# Patient Record
Sex: FEMALE | Employment: FULL TIME | URBAN - METROPOLITAN AREA
[De-identification: names, ages, dates, MRNs, and addresses within clinical notes are randomized per-mention and may not be internally consistent; named-entity substitution may affect disease eponyms.]

---

## 2022-08-26 NOTE — PROGRESS NOTES
"              Assessment & Plan     Routine history and physical examination of adult  Health maintenance reviewed and updated.    Mild recurrent major depression (H)  Stable, refills given   Plan recheck in 6 months / okay to do video visit  - sertraline (ZOLOFT) 50 MG tablet; Take 1 tablet (50 mg) by mouth daily    Visit for screening mammogram  - *MA Screening Digital Bilateral; Future    Herpes simplex  Stable, refills given  - valACYclovir (VALTREX) 1000 mg tablet; Take 1 tablet (1,000 mg) by mouth daily Take 1,000 mg by mouth daily    Screening for malignant neoplasm of cervix  - Pap Screen with HPV - recommended age 30 - 65 years             BMI:   Estimated body mass index is 28.43 kg/m  as calculated from the following:    Height as of this encounter: 1.689 m (5' 6.5\").    Weight as of this encounter: 81.1 kg (178 lb 12.8 oz).   Weight management plan: Discussed healthy diet and exercise guidelines        Return in about 6 months (around 2/28/2023) for depression / anxiety, medication check.    Kristen M. Kehr, PA-C [facility]    Meghna Polanco is a 42 year old, presenting for the following health issues:  Establish Care    She will need refills of her medications.   1. Depression: managed with sertraline 50 mg daily  2. Herpes simplex: managed with daily suppressive vacyclovir 1 gm  3. She also has an IUD x 5 years. She can not feel the strings.       History of Present Illness       Reason for visit:  New patient/physical    She eats 2-3 servings of fruits and vegetables daily.She consumes 0 sweetened beverage(s) daily.She exercises with enough effort to increase her heart rate 30 to 60 minutes per day.  She exercises with enough effort to increase her heart rate 4 days per week. She is missing 1 dose(s) of medications per week.     Patient requesting refills for valacyclovir and sertraline. Patient requesting to have IUD strings checked.     PMH / PSH: reviewed and updated. " "      Review of Systems   Constitutional, HEENT, cardiovascular, pulmonary, GI, , musculoskeletal, neuro, skin, endocrine and psych systems are negative, except as otherwise noted.      Objective    /76   Pulse 58   Temp 98.5  F (36.9  C) (Tympanic)   Resp 16   Ht 1.689 m (5' 6.5\")   Wt 81.1 kg (178 lb 12.8 oz)   LMP 08/28/2022 (Exact Date)   SpO2 100%   BMI 28.43 kg/m    Body mass index is 28.43 kg/m .  Physical Exam   GENERAL: healthy, alert and no distress  EYES: Eyes grossly normal to inspection, PERRL and conjunctivae and sclerae normal  HENT: ear canals and TM's normal, nose and mouth without ulcers or lesions  NECK: no adenopathy, no asymmetry, masses, or scars and thyroid normal to palpation  RESP: lungs clear to auscultation - no rales, rhonchi or wheezes  CV: regular rate and rhythm, normal S1 S2, no S3 or S4, no murmur, click or rub, no peripheral edema and peripheral pulses strong  ABDOMEN: soft, nontender, no hepatosplenomegaly, no masses and bowel sounds normal   (female): normal female external genitalia, normal urethral meatus , vaginal mucosa pink, moist, well rugated, normal cervix, adnexae, and uterus without masses. IUD strings are visualized  MS: no gross musculoskeletal defects noted, no edema  SKIN: no suspicious lesions or rashes  NEURO: Normal strength and tone, mentation intact and speech normal  BACK: no CVA tenderness, no paralumbar tenderness                    .  ..  "

## 2022-08-29 ENCOUNTER — OFFICE VISIT (OUTPATIENT)
Dept: FAMILY MEDICINE | Facility: CLINIC | Age: 42
End: 2022-08-29
Payer: COMMERCIAL

## 2022-08-29 VITALS
TEMPERATURE: 98.5 F | OXYGEN SATURATION: 100 % | BODY MASS INDEX: 28.73 KG/M2 | HEIGHT: 66 IN | RESPIRATION RATE: 16 BRPM | SYSTOLIC BLOOD PRESSURE: 125 MMHG | WEIGHT: 178.8 LBS | DIASTOLIC BLOOD PRESSURE: 76 MMHG | HEART RATE: 58 BPM

## 2022-08-29 DIAGNOSIS — F33.0 MILD RECURRENT MAJOR DEPRESSION (H): ICD-10-CM

## 2022-08-29 DIAGNOSIS — Z12.31 VISIT FOR SCREENING MAMMOGRAM: ICD-10-CM

## 2022-08-29 DIAGNOSIS — Z12.4 SCREENING FOR MALIGNANT NEOPLASM OF CERVIX: ICD-10-CM

## 2022-08-29 DIAGNOSIS — Z00.00 ROUTINE HISTORY AND PHYSICAL EXAMINATION OF ADULT: Primary | ICD-10-CM

## 2022-08-29 DIAGNOSIS — B00.9 HERPES SIMPLEX: ICD-10-CM

## 2022-08-29 PROCEDURE — 99386 PREV VISIT NEW AGE 40-64: CPT | Performed by: PHYSICIAN ASSISTANT

## 2022-08-29 PROCEDURE — 99213 OFFICE O/P EST LOW 20 MIN: CPT | Mod: 25 | Performed by: PHYSICIAN ASSISTANT

## 2022-08-29 PROCEDURE — G0145 SCR C/V CYTO,THINLAYER,RESCR: HCPCS | Performed by: PHYSICIAN ASSISTANT

## 2022-08-29 PROCEDURE — 87624 HPV HI-RISK TYP POOLED RSLT: CPT | Performed by: PHYSICIAN ASSISTANT

## 2022-08-29 RX ORDER — CETIRIZINE HYDROCHLORIDE 10 MG/1
10 TABLET ORAL
COMMUNITY

## 2022-08-29 RX ORDER — VALACYCLOVIR HYDROCHLORIDE 1 G/1
1000 TABLET, FILM COATED ORAL DAILY
COMMUNITY
Start: 2022-08-05 | End: 2022-08-29

## 2022-08-29 RX ORDER — VALACYCLOVIR HYDROCHLORIDE 1 G/1
1000 TABLET, FILM COATED ORAL DAILY
Qty: 90 TABLET | Refills: 3 | Status: SHIPPED | OUTPATIENT
Start: 2022-08-29

## 2022-08-29 ASSESSMENT — PAIN SCALES - GENERAL: PAINLEVEL: NO PAIN (1)

## 2022-08-29 ASSESSMENT — PATIENT HEALTH QUESTIONNAIRE - PHQ9: SUM OF ALL RESPONSES TO PHQ QUESTIONS 1-9: 3

## 2022-08-29 NOTE — PROGRESS NOTES
SUBJECTIVE:   CC: Sherri Milton is an 42 year old woman who presents for preventive health visit.       Patient has been advised of split billing requirements and indicates understanding: Yes  HPI        PROBLEMS TO ADD ON...  1. Depression: managed with sertraline 50 mg daily  2. Herpes Simplex: managed with valtrex 1 gm daily for suppression    Today's PHQ-2 Score:   PHQ-2 ( 1999 Pf[employer]) 8/29/2022   Q1: Little interest or pleasure in doing things 0   Q2: Feeling down, depressed or hopeless 0   PHQ-2 Score 0   Q1: Little interest or pleasure in doing things Not at all   Q2: Feeling down, depressed or hopeless Not at all   PHQ-2 Score 0       Abuse: Current or Past (Physical, Sexual or Emotional) - No  Do you feel safe in your environment? Yes        Social History     Tobacco Use     Smoking status: Former Smoker     Smokeless tobacco: Never Used   Substance Use Topics     Alcohol use: Yes     Comment: 1 drink/day         No flowsheet data found.    Reviewed orders with patient.  Reviewed health maintenance and updated orders accordingly - Yes  BP Readings from Last 3 Encounters:   08/29/22 125/76    Wt Readings from Last 3 Encounters:   08/29/22 81.1 kg (178 lb 12.8 oz)                  Patient Active Problem List   Diagnosis     Mild recurrent major depression (H)     Herpes simplex     Past Surgical History:   Procedure Laterality Date     APPENDECTOMY         Social History     Tobacco Use     Smoking status: Former Smoker     Smokeless tobacco: Never Used   Substance Use Topics     Alcohol use: Yes     Comment: 1 drink/day     Family History   Problem Relation Age of Onset     Asthma Mother      Hyperlipidemia Mother      Hypertension Mother      Other - See Comments Mother         Factor II     Hypertension Father      Hyperlipidemia Father      Lymphoma Father      Heart Failure Maternal Grandmother      Diabetes Maternal Grandmother      Heart Disease Maternal Grandfather      Cerebrovascular Disease  Maternal Grandfather      Heart Failure Paternal Grandmother      Diabetes Paternal Grandfather      Heart Disease Paternal Grandfather      Other - See Comments Sister         Factor II     Asthma Son      Allergies Son         seasonal, peanut allergy         Current Outpatient Medications   Medication Sig Dispense Refill     cetirizine (ZYRTEC) 10 MG tablet Take 10 mg by mouth       sertraline (ZOLOFT) 50 MG tablet Take 1 tablet (50 mg) by mouth daily 90 tablet 1     valACYclovir (VALTREX) 1000 mg tablet Take 1 tablet (1,000 mg) by mouth daily Take 1,000 mg by mouth daily 90 tablet 3     Allergies   Allergen Reactions     Nuts Hives and Itching     Animal Dander Hives     Grass Extracts [Gramineae Pollens] Cough, Itching and Other (See Comments)     No lab results found.     Breast Cancer Screening:    FHS-7: No flowsheet data found.    Mammogram Screening - Offered annual screening and updated Health Maintenance for mutual plan based on risk factor consideration    Pertinent mammograms are reviewed under the imaging tab.    History of abnormal Pap smear: NO - age 30-65 PAP every 5 years with negative HPV co-testing recommended  Last 3 Pap and HPV Results:       Reviewed and updated as needed this visit by clinical staff   Tobacco  Allergies  Meds  Problems  Med Hx  Surg Hx  Fam Hx  Soc   Hx          Reviewed and updated as needed this visit by Provider   Tobacco  Allergies  Meds  Problems  Med Hx  Surg Hx  Fam Hx           Past Medical History:   Diagnosis Date     Depression      Factor II deficiency (H)      Herpes simplex       Past Surgical History:   Procedure Laterality Date     APPENDECTOMY       OB History   No obstetric history on file.       Review of Systems  CONSTITUTIONAL: NEGATIVE for fever, chills, change in weight  INTEGUMENTARU/SKIN: NEGATIVE for worrisome rashes, moles or lesions  EYES: NEGATIVE for vision changes or irritation  ENT: NEGATIVE for ear, mouth and throat  "problems  RESP: NEGATIVE for significant cough or SOB  BREAST: NEGATIVE for masses, tenderness or discharge  CV: NEGATIVE for chest pain, palpitations or peripheral edema  GI: NEGATIVE for nausea, abdominal pain, heartburn, or change in bowel habits  : NEGATIVE for unusual urinary or vaginal symptoms. Periods are regular.  MUSCULOSKELETAL: NEGATIVE for significant arthralgias or myalgia  NEURO: NEGATIVE for weakness, dizziness or paresthesias  ENDOCRINE: NEGATIVE for temperature intolerance, skin/hair changes  PSYCHIATRIC: NEGATIVE for changes in mood or affect     OBJECTIVE:   /76   Pulse 58   Temp 98.5  F (36.9  C) (Tympanic)   Resp 16   Ht 1.689 m (5' 6.5\")   Wt 81.1 kg (178 lb 12.8 oz)   LMP 08/28/2022 (Exact Date)   SpO2 100%   BMI 28.43 kg/m    Physical Exam  GENERAL: healthy, alert and no distress  EYES: Eyes grossly normal to inspection, PERRL and conjunctivae and sclerae normal  HENT: ear canals and TM's normal, nose and mouth without ulcers or lesions  NECK: no adenopathy, no asymmetry, masses, or scars and thyroid normal to palpation  RESP: lungs clear to auscultation - no rales, rhonchi or wheezes  BREAST: normal without masses, tenderness or nipple discharge and no palpable axillary masses or adenopathy  CV: regular rate and rhythm, normal S1 S2, no S3 or S4, no murmur, click or rub, no peripheral edema and peripheral pulses strong  ABDOMEN: soft, nontender, no hepatosplenomegaly, no masses and bowel sounds normal   (female): normal female external genitalia, normal urethral meatus, vaginal mucosa pink, moist, well rugated, and normal cervix/adnexa/uterus without masses. Blood discharge present, IUD strings visualized.   MS: no gross musculoskeletal defects noted, no edema  SKIN: no suspicious lesions or rashes  NEURO: Normal strength and tone, mentation intact and speech normal  PSYCH: mentation appears normal, affect normal/bright    Diagnostic Test Results:  Labs reviewed in " "Epic    ASSESSMENT/PLAN:   (Z00.00) Routine history and physical examination of adult  (primary encounter diagnosis)  Comment: Health maintenance reviewed and updated.      (F33.0) Mild recurrent major depression (H)  Comment: stable, refills given continue with the sertraline.   Plan follow up in 6 months. Video visit will be appropriate for follow up   Plan: sertraline (ZOLOFT) 50 MG tablet            (Z12.31) Visit for screening mammogram  Comment: scheduled.   Plan: *MA Screening Digital Bilateral            (B00.9) Herpes simplex  Comment: stable, refills given   Plan: valACYclovir (VALTREX) 1000 mg tablet            (Z12.4) Screening for malignant neoplasm of cervix  Plan: Pap Screen with HPV - recommended age 30 - 65         years              Patient has been advised of split billing requirements and indicates understanding: Yes    COUNSELING:  Reviewed preventive health counseling, as reflected in patient instructions       Regular exercise       Healthy diet/nutrition    Estimated body mass index is 28.43 kg/m  as calculated from the following:    Height as of this encounter: 1.689 m (5' 6.5\").    Weight as of this encounter: 81.1 kg (178 lb 12.8 oz).    Weight management plan: Discussed healthy diet and exercise guidelines    She reports that she has quit smoking. She has never used smokeless tobacco.      Counseling Resources:  ATP IV Guidelines  Pooled Cohorts Equation Calculator  Breast Cancer Risk Calculator  BRCA-Related Cancer Risk Assessment: FHS-7 Tool  FRAX Risk Assessment  ICSI Preventive Guidelines  Dietary Guidelines for Americans, 2010  USDA's MyPlate  ASA Prophylaxis  Lung CA Screening    Kristen M. Kehr, PA-C [facility]  "

## 2022-08-31 LAB
BKR LAB AP GYN ADEQUACY: NORMAL
BKR LAB AP GYN INTERPRETATION: NORMAL
BKR LAB AP HPV REFLEX: NORMAL
BKR LAB AP PREVIOUS ABNORMAL: NORMAL
PATH REPORT.COMMENTS IMP SPEC: NORMAL
PATH REPORT.COMMENTS IMP SPEC: NORMAL
PATH REPORT.RELEVANT HX SPEC: NORMAL

## 2022-09-01 LAB
HUMAN PAPILLOMA VIRUS 16 DNA: NEGATIVE
HUMAN PAPILLOMA VIRUS 18 DNA: NEGATIVE
HUMAN PAPILLOMA VIRUS FINAL DIAGNOSIS: ABNORMAL
HUMAN PAPILLOMA VIRUS OTHER HR: POSITIVE

## 2022-09-02 ENCOUNTER — PATIENT OUTREACH (OUTPATIENT)
Dept: FAMILY MEDICINE | Facility: CLINIC | Age: 42
End: 2022-09-02

## 2022-10-28 ENCOUNTER — ANCILLARY PROCEDURE (OUTPATIENT)
Dept: MAMMOGRAPHY | Facility: CLINIC | Age: 42
End: 2022-10-28
Attending: PHYSICIAN ASSISTANT
Payer: COMMERCIAL

## 2022-10-28 DIAGNOSIS — Z12.31 VISIT FOR SCREENING MAMMOGRAM: ICD-10-CM

## 2022-10-28 PROCEDURE — 77063 BREAST TOMOSYNTHESIS BI: CPT | Mod: TC | Performed by: STUDENT IN AN ORGANIZED HEALTH CARE EDUCATION/TRAINING PROGRAM

## 2022-10-28 PROCEDURE — 77067 SCR MAMMO BI INCL CAD: CPT | Mod: TC | Performed by: STUDENT IN AN ORGANIZED HEALTH CARE EDUCATION/TRAINING PROGRAM

## 2022-11-08 ENCOUNTER — ANCILLARY PROCEDURE (OUTPATIENT)
Dept: ULTRASOUND IMAGING | Facility: CLINIC | Age: 42
End: 2022-11-08
Attending: PHYSICIAN ASSISTANT
Payer: COMMERCIAL

## 2022-11-08 ENCOUNTER — ANCILLARY PROCEDURE (OUTPATIENT)
Dept: MAMMOGRAPHY | Facility: CLINIC | Age: 42
End: 2022-11-08
Attending: PHYSICIAN ASSISTANT
Payer: COMMERCIAL

## 2022-11-08 DIAGNOSIS — R92.8 ABNORMAL MAMMOGRAM: ICD-10-CM

## 2022-11-08 PROCEDURE — G0279 TOMOSYNTHESIS, MAMMO: HCPCS | Performed by: RADIOLOGY

## 2022-11-08 PROCEDURE — 76642 ULTRASOUND BREAST LIMITED: CPT | Mod: LT | Performed by: RADIOLOGY

## 2022-11-08 PROCEDURE — 77065 DX MAMMO INCL CAD UNI: CPT | Mod: LT | Performed by: RADIOLOGY

## 2022-12-28 ENCOUNTER — VIRTUAL VISIT (OUTPATIENT)
Dept: INTERNAL MEDICINE | Facility: CLINIC | Age: 42
End: 2022-12-28
Payer: COMMERCIAL

## 2022-12-28 DIAGNOSIS — G44.219 EPISODIC TENSION-TYPE HEADACHE, NOT INTRACTABLE: Primary | ICD-10-CM

## 2022-12-28 PROBLEM — F33.0 MILD RECURRENT MAJOR DEPRESSION (H): Status: RESOLVED | Noted: 2022-08-29 | Resolved: 2022-12-28

## 2022-12-28 PROCEDURE — 99213 OFFICE O/P EST LOW 20 MIN: CPT | Mod: GT | Performed by: INTERNAL MEDICINE

## 2022-12-28 NOTE — PROGRESS NOTES
ASSESSMENT AND PLAN:    Tension headache  History and evaluation today strongly suggest a myofascial cervical posture pain with muscle tension headache.  Discussed postural strain with computer work. Discussed using ibuprofen, 200 mg po bid prn, postural neck stretching and ROM, and postural points for chronic computer strain.  Reassured that significant pathology is not suggested, and follow up if fails to improve.     Video start time: 10:36 AM    Video end time: 10: 50 AM    [employer]    On site    Total time:  22 min    CHIEF COMPLAINT:  Headache    HISTORY OF PRESENT ILLNESS:  Sherri Milton is a 42 year old female with a recurrent right above eye headache, dull, non-throbbing, mostly during the day, headache with sense of tightness in back of neck. 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 Sleeps well, occasionally it is there on awakening.  A mild photophobia at times, but no throbbing, or nausea or visual disturbance, no symptoms or arm or leg incoordination or numbness or weakness.  A brief resting period can help.  Has two children, ages 11,12.  No unusual stress, or excess alcohol, no change in modest caffeine use. No head trauma, otherwise well.  She has IUD and so not premenstrual or any pattern to suggest that.     REVIEW OF SYSTEMS:   See HPI, all other systems on review are negative.    Past Medical History:   Diagnosis Date     Depression      Factor II deficiency (H)      Herpes simplex      History   Smoking Status     Former   Smokeless Tobacco     Never     Family History   Problem Relation Age of Onset     Asthma Mother      Hyperlipidemia Mother      Hypertension Mother      Other - See Comments Mother         Factor II     Hypertension Father      Hyperlipidemia Father      Lymphoma Father      Heart Failure Maternal Grandmother      Diabetes Maternal Grandmother      Heart Disease Maternal Grandfather      Cerebrovascular Disease Maternal Grandfather      Heart Failure Paternal  Grandmother      Diabetes Paternal Grandfather      Heart Disease Paternal Grandfather      Other - See Comments Sister         Factor II     Asthma Son      Allergies Son         seasonal, peanut allergy     Past Surgical History:   Procedure Laterality Date     APPENDECTOMY       VITALS:  There were no vitals filed for this visit.  Wt Readings from Last 3 Encounters:   08/29/22 81.1 kg (178 lb 12.8 oz)     PHYSICAL EXAM:  Constitutional:  In NAD, alert and oriented  She is animated, comfortable, with clear thinking and speech, in no distress during interview    Current Outpatient Medications   Medication Sig Dispense Refill     cetirizine (ZYRTEC) 10 MG tablet Take 10 mg by mouth       sertraline (ZOLOFT) 50 MG tablet Take 1 tablet (50 mg) by mouth daily 90 tablet 1     valACYclovir (VALTREX) 1000 mg tablet Take 1 tablet (1,000 mg) by mouth daily Take 1,000 mg by mouth daily 90 tablet 3     Angel Luis Strange MD  Internal Medicine  [facility]

## 2023-05-19 ENCOUNTER — OFFICE VISIT (OUTPATIENT)
Dept: URGENT CARE | Facility: URGENT CARE | Age: 43
End: 2023-05-19
Payer: COMMERCIAL

## 2023-05-19 VITALS
TEMPERATURE: 98 F | BODY MASS INDEX: 28.94 KG/M2 | OXYGEN SATURATION: 100 % | RESPIRATION RATE: 14 BRPM | HEART RATE: 78 BPM | SYSTOLIC BLOOD PRESSURE: 122 MMHG | DIASTOLIC BLOOD PRESSURE: 77 MMHG | WEIGHT: 182 LBS

## 2023-05-19 DIAGNOSIS — N39.0 ACUTE UTI (URINARY TRACT INFECTION): Primary | ICD-10-CM

## 2023-05-19 LAB
ALBUMIN UR-MCNC: NEGATIVE MG/DL
APPEARANCE UR: ABNORMAL
BACTERIA #/AREA URNS HPF: ABNORMAL /HPF
BILIRUB UR QL STRIP: NEGATIVE
CLUE CELLS: ABNORMAL
COLOR UR AUTO: YELLOW
GLUCOSE UR STRIP-MCNC: NEGATIVE MG/DL
HCG UR QL: NEGATIVE
HGB UR QL STRIP: NEGATIVE
KETONES UR STRIP-MCNC: NEGATIVE MG/DL
LEUKOCYTE ESTERASE UR QL STRIP: ABNORMAL
NITRATE UR QL: NEGATIVE
PH UR STRIP: 6.5 [PH] (ref 5–7)
RBC #/AREA URNS AUTO: ABNORMAL /HPF
SP GR UR STRIP: 1.02 (ref 1–1.03)
SQUAMOUS #/AREA URNS AUTO: ABNORMAL /LPF
TRICHOMONAS, WET PREP: ABNORMAL
UROBILINOGEN UR STRIP-ACNC: 1 E.U./DL
WBC #/AREA URNS AUTO: ABNORMAL /HPF
WBC'S/HIGH POWER FIELD, WET PREP: ABNORMAL
YEAST, WET PREP: ABNORMAL

## 2023-05-19 PROCEDURE — 87186 SC STD MICRODIL/AGAR DIL: CPT | Performed by: EMERGENCY MEDICINE

## 2023-05-19 PROCEDURE — 81025 URINE PREGNANCY TEST: CPT | Performed by: EMERGENCY MEDICINE

## 2023-05-19 PROCEDURE — 87086 URINE CULTURE/COLONY COUNT: CPT | Performed by: EMERGENCY MEDICINE

## 2023-05-19 PROCEDURE — 81001 URINALYSIS AUTO W/SCOPE: CPT | Performed by: EMERGENCY MEDICINE

## 2023-05-19 PROCEDURE — 99214 OFFICE O/P EST MOD 30 MIN: CPT | Performed by: EMERGENCY MEDICINE

## 2023-05-19 PROCEDURE — 87210 SMEAR WET MOUNT SALINE/INK: CPT | Performed by: EMERGENCY MEDICINE

## 2023-05-19 RX ORDER — NITROFURANTOIN 25; 75 MG/1; MG/1
100 CAPSULE ORAL 2 TIMES DAILY
Qty: 10 CAPSULE | Refills: 0 | Status: SHIPPED | OUTPATIENT
Start: 2023-05-19 | End: 2023-05-24

## 2023-05-19 NOTE — PROGRESS NOTES
Assessment & Plan     Diagnosis:  (N39.0) Acute UTI (urinary tract infection)  (primary encounter diagnosis)  Plan: Urine Culture, nitroFURantoin        macrocrystal-monohydrate (MACROBID) 100 MG         capsule    Medical Decision Making:  Sherri Milton is a 42 year old female who presents to clinic today for evaluation of urinary frequency, urgency and dysuria. This clinically is consistent with a urinary tract infection.  Urinalysis confirms the infection.  The patient is not pregnant. There has been no fever, confusion, flank pain or significant abdominal pain.  The patient is not concerned about STDs and testing not indicated. Wet prep is negative. There is no clinical evidence of pyelonephritis, appendicitis, colitis, diverticulitis or any intraabdominal catastrophe. The patient will be started on antibiotics for the infection. Go to the ER if increasing pain, vomiting, fever, or inability to tolerate the oral antibiotic.  Follow up with primary physician is indicated if not improving in 2-3 days.     Elpidio Means PA-C  [facility]    Subjective     Sherri Milton is a 42 year old female who presents to clinic today for the following health issues:  Chief Complaint   Patient presents with     Rule out Urinary Tract Infection     Sx of urge frequency , with burning and abdominal discomfort, for 2 days. Going out of town camping and would like to get on meds. LMP 4/30/2023 approx.         HPI  Patient states that for the past week she has experienced a slight burning sensation, and frequency and urgency of urianating. This has gotten worse the past two days with now having some lower abdominal pain and lots of urinary urgency. Patient denies any flank or back pain, fever, nausea, vomiting, diarrhea, inability to urinate, vaginal discharge/bleeding or concerns for STDS.    Review of Systems    See HPI    Objective      Vitals:    Patient Vitals for the past 24 hrs:   BP Temp Temp src Pulse  Resp SpO2 Weight   05/19/23 1047 122/77 98  F (36.7  C) Tympanic 78 14 100 % 82.6 kg (182 lb)         Vital signs reviewed by: Elpidio Means PA-C    Physical Exam   Constitutional: The patient is oriented to person, place, and time. Alert and cooperative. No acute distress.  Mouth: Mucous membranes are moist.  Cardiovascular: Regular rate and rhythm.  Pulmonary/Chest: Effort normal. No respiratory distress.   GI: Abdomen with mild suprapubic tenderness to palpation. Soft and non-distended. No rebound or guarding. No McBurney point tenderness.   MSK: No CVA tenderness bilaterally.  Neurological: Alert and oriented x3.     Labs/Imaging:  Results for orders placed or performed in visit on 05/19/23   UA Macroscopic with reflex to Microscopic and Culture     Status: Abnormal    Specimen: Urine, Clean Catch   Result Value Ref Range    Color Urine Yellow Colorless, Straw, Light Yellow, Yellow    Appearance Urine Slightly Cloudy (A) Clear    Glucose Urine Negative Negative mg/dL    Bilirubin Urine Negative Negative    Ketones Urine Negative Negative mg/dL    Specific Gravity Urine 1.020 1.003 - 1.035    Blood Urine Negative Negative    pH Urine 6.5 5.0 - 7.0    Protein Albumin Urine Negative Negative mg/dL    Urobilinogen Urine 1.0 0.2, 1.0 E.U./dL    Nitrite Urine Negative Negative    Leukocyte Esterase Urine Moderate (A) Negative   Urine Microscopic Exam     Status: Abnormal   Result Value Ref Range    Bacteria Urine Moderate (A) None Seen /HPF    RBC Urine 0-2 0-2 /HPF /HPF    WBC Urine  (A) 0-5 /HPF /HPF    Squamous Epithelials Urine Moderate (A) None Seen /LPF   HCG Qual, Urine ([id number])     Status: Normal   Result Value Ref Range    hCG Urine Qualitative Negative Negative   Wet prep - lab collect     Status: Abnormal    Specimen: Vagina; Swab   Result Value Ref Range    Trichomonas Absent Absent    Yeast Absent Absent    Clue Cells Absent Absent    WBCs/high power field 3+ (A) None         Elpidio Means  AQUILES, May 19, 2023

## 2023-05-20 LAB — BACTERIA UR CULT: ABNORMAL

## 2023-06-06 ENCOUNTER — OFFICE VISIT (OUTPATIENT)
Dept: URGENT CARE | Facility: URGENT CARE | Age: 43
End: 2023-06-06
Payer: COMMERCIAL

## 2023-06-06 VITALS
WEIGHT: 184 LBS | DIASTOLIC BLOOD PRESSURE: 73 MMHG | BODY MASS INDEX: 29.26 KG/M2 | HEART RATE: 76 BPM | RESPIRATION RATE: 16 BRPM | OXYGEN SATURATION: 100 % | TEMPERATURE: 98.3 F | SYSTOLIC BLOOD PRESSURE: 116 MMHG

## 2023-06-06 DIAGNOSIS — M54.42 ACUTE BILATERAL LOW BACK PAIN WITH LEFT-SIDED SCIATICA: Primary | ICD-10-CM

## 2023-06-06 PROCEDURE — 99213 OFFICE O/P EST LOW 20 MIN: CPT

## 2023-06-06 RX ORDER — CYCLOBENZAPRINE HCL 10 MG
10 TABLET ORAL 3 TIMES DAILY PRN
Qty: 21 TABLET | Refills: 0 | Status: SHIPPED | OUTPATIENT
Start: 2023-06-06 | End: 2023-10-09

## 2023-06-06 RX ORDER — IBUPROFEN 200 MG
400 TABLET ORAL EVERY 4 HOURS PRN
COMMUNITY
End: 2023-10-09

## 2023-06-06 NOTE — PROGRESS NOTES
ASSESSMENT:  (M54.42) Acute bilateral low back pain with left-sided sciatica  (primary encounter diagnosis)  Plan: cyclobenzaprine (FLEXERIL) 10 MG tablet    PLAN:  When you have low back pain: Caring for your back and cyclobenzaprine patient instructions discussed and provided.  Informed the patient that she can continue to take ibuprofen and use ice for pain.  We discussed taking the Flexeril as needed for more severe pain.  We also discussed she can try over-the-counter 4% lidocaine patches.  Instructed her to follow-up with her primary care provider or orthopedics should symptoms persist or recur.  Patient acknowledged her understanding of the above plan.    The use of Dragon/PowerMic dictation services may have been used to construct the content in this note; any grammatical or spelling errors are non-intentional. Please contact the author of this note directly if you are in need of any clarification.    Juarez Bhatti, MARINO CNP    SUBJECTIVE:  Sherri Milton is a 43 year old female seen in clinic today for evaluation of back pain.   Symptoms have beening going on for 1 day.  Patient reports doing a lot of yard work over the weekend prior to the onset of the back pain.   Pain is located in the low back bilateral region, with radiation to radiates into the left leg, and are at worst a 8 on a scale of 1-10.  Measures which improve the pain include heat, ice and OTC NSAIDs.  Measures which worsen the pain include standing  Personal hx of back pain is recurrent self limited episodes of low back pain in the past.  There is no history of lower extremity numbness/weakness or change in bowel/bladder control.    ROS:  Negative except noted above.     OBJECTIVE:  Blood pressure 116/73, pulse 76, temperature 98.3  F (36.8  C), temperature source Tympanic, resp. rate 16, weight 83.5 kg (184 lb), last menstrual period 04/30/2023, SpO2 100 %.  GENERAL APPEARANCE: healthy, alert and no distress  RESP: lungs clear to  auscultation - no rales, rhonchi or wheezes  CV: regular rates and rhythm, normal S1 S2, no murmur noted  MSK:  Lumbar:  Inspection: No obvious deformity, erythema, edema, or ecchymosis of the thoracic or lumbar spine.   Palpation: Tenderness with palpation along the lumbar spine and surrounding musculature.   Sensation: grossly intact throughout bilateral upper and lower extremities   Range of Motion:  lumbar flexion  decreased, painful, lumbar extension  decreased, painful, left lateral lumbar rotation  decreased, painful, right lateral lumbar rotation  decreased, painful  Patient could not perform straight leg raises due to pain in lower back. Tender internal and external rotation of the hips. 2+ DTR s, lower extremity CMS intact.  Gait normal.   NEURO: Normal strength and tone with no weakness or sensory deficit noted, reflexes normal   SKIN: no suspicious lesions or rashes

## 2023-06-06 NOTE — PATIENT INSTRUCTIONS
You can continue to take ibuprofen and use ice for the pain.  Take the flexeril as needed for more severe pain.  You can also try over the counter 4% lidocaine patches.  Follow up with your primary care provider or orthopedics should symptoms persist or recur.

## 2023-08-09 PROBLEM — R87.810 CERVICAL HIGH RISK HPV (HUMAN PAPILLOMAVIRUS) TEST POSITIVE: Status: ACTIVE | Noted: 2022-08-29

## 2023-09-07 ENCOUNTER — OFFICE VISIT (OUTPATIENT)
Dept: URGENT CARE | Facility: URGENT CARE | Age: 43
End: 2023-09-07
Payer: COMMERCIAL

## 2023-09-07 VITALS
RESPIRATION RATE: 16 BRPM | DIASTOLIC BLOOD PRESSURE: 82 MMHG | SYSTOLIC BLOOD PRESSURE: 130 MMHG | OXYGEN SATURATION: 100 % | TEMPERATURE: 98.6 F | BODY MASS INDEX: 29.1 KG/M2 | HEART RATE: 74 BPM | WEIGHT: 183 LBS

## 2023-09-07 DIAGNOSIS — N30.01 ACUTE CYSTITIS WITH HEMATURIA: Primary | ICD-10-CM

## 2023-09-07 DIAGNOSIS — R30.0 DYSURIA: ICD-10-CM

## 2023-09-07 LAB
BACTERIA #/AREA URNS HPF: ABNORMAL /HPF
CLUE CELLS: ABNORMAL
RBC #/AREA URNS AUTO: ABNORMAL /HPF
SQUAMOUS #/AREA URNS AUTO: ABNORMAL /LPF
TRICHOMONAS, WET PREP: ABNORMAL
WBC #/AREA URNS AUTO: ABNORMAL /HPF
WBC'S/HIGH POWER FIELD, WET PREP: ABNORMAL
YEAST, WET PREP: ABNORMAL

## 2023-09-07 PROCEDURE — 81015 MICROSCOPIC EXAM OF URINE: CPT | Performed by: NURSE PRACTITIONER

## 2023-09-07 PROCEDURE — 99213 OFFICE O/P EST LOW 20 MIN: CPT | Performed by: NURSE PRACTITIONER

## 2023-09-07 PROCEDURE — 87210 SMEAR WET MOUNT SALINE/INK: CPT | Performed by: NURSE PRACTITIONER

## 2023-09-07 PROCEDURE — 87186 SC STD MICRODIL/AGAR DIL: CPT | Performed by: NURSE PRACTITIONER

## 2023-09-07 PROCEDURE — 87086 URINE CULTURE/COLONY COUNT: CPT | Performed by: NURSE PRACTITIONER

## 2023-09-07 RX ORDER — NITROFURANTOIN 25; 75 MG/1; MG/1
100 CAPSULE ORAL 2 TIMES DAILY
Qty: 10 CAPSULE | Refills: 0 | Status: SHIPPED | OUTPATIENT
Start: 2023-09-07 | End: 2023-09-12

## 2023-09-07 NOTE — PROGRESS NOTES
Assessment & Plan     Acute cystitis with hematuria    - nitroFURantoin macrocrystal-monohydrate (MACROBID) 100 MG capsule  Dispense: 10 capsule; Refill: 0    Dysuria    - UA Macroscopic with reflex to Microscopic and Culture  - Wet preparation  - Wet preparation  - UA Microscopic with Reflex to Culture  - UA Microscopic with Reflex to Culture  - Urine Culture     Reviewed wet prep showing no BV, trich, yeast infection.  Reviewed UA during visit showing likely UTI. Prescription sent to pharmacy for Macrobid twice daily for 5 days. Urine culture in process, will notify if patient should stop or change antibiotic.  Increase fluid intake, decrease caffeine which is a bladder irritant. Follow-up with urology as planned.     Follow-up with PCP if symptoms persist for 3 days, and sooner if symptoms worsen or new symptoms develop.     Discussed red flag symptoms which warrant immediate visit in emergency room    All questions were answered and patient verbalized understanding. AVS sent via [employer] Tovar, RICARDO, APRN, CNP 9/7/2023 10:56 AM  [facility]          Meghna Polanco is a 43 year old female who presents to clinic today for the following health issues:  Chief Complaint   Patient presents with    Urgent Care    UTI     Per patient states symptoms started yesterday        UTI    Onset of symptoms was 1 day(s).  Course of illness is worsening  Current and associated symptoms dysuria, frequency, and suprapubic pain and pressure  Denies fever, nausea, emesis, flank pain, hematuria, vaginal discharge, itching, odor  Treatment and measures tried azo helped yesterday and today, cranberry supplement  Predisposing factors include frequent UTI  Denies  history of Pyelonephritis, diabetes, and kidney stones, kidney disease  She drinks about 2 large glasses of water daily and 2 cups caffeine  She tries to void after intercourse  She was treated with macrobid in may 2023 for UTI and Bactrim  in August for UTI which she completed.   She has made an appt with urology for further evaluation     Problem list, Medication list, Allergies, and Medical history reviewed in EPIC.    ROS:  Review of systems negative except for noted above        Objective    /82   Pulse 74   Temp 98.6  F (37  C) (Tympanic)   Resp 16   Wt 83 kg (183 lb)   SpO2 100%   BMI 29.10 kg/m    Physical Exam  Constitutional:       General: She is not in acute distress.     Appearance: She is not toxic-appearing or diaphoretic.   Abdominal:      General: Bowel sounds are normal. There is no distension.      Palpations: Abdomen is soft.      Tenderness: There is no abdominal tenderness. There is no right CVA tenderness, left CVA tenderness, guarding or rebound.   Lymphadenopathy:      Cervical: No cervical adenopathy.   Skin:     General: Skin is warm and dry.   Neurological:      Mental Status: She is alert.              Labs:  Results for orders placed or performed in visit on 09/07/23   UA Microscopic with Reflex to Culture     Status: Abnormal   Result Value Ref Range    Bacteria Urine Few (A) None Seen /HPF    RBC Urine 10-25 (A) 0-2 /HPF /HPF    WBC Urine 25-50 (A) 0-5 /HPF /HPF    Squamous Epithelials Urine Few (A) None Seen /LPF   Wet preparation     Status: Abnormal    Specimen: Vagina; Swab   Result Value Ref Range    Trichomonas Absent Absent    Yeast Absent Absent    Clue Cells Absent Absent    WBCs/high power field 3+ (A) None

## 2023-09-09 LAB — BACTERIA UR CULT: ABNORMAL

## 2023-10-09 ENCOUNTER — OFFICE VISIT (OUTPATIENT)
Dept: FAMILY MEDICINE | Facility: CLINIC | Age: 43
End: 2023-10-09
Payer: COMMERCIAL

## 2023-10-09 VITALS
HEIGHT: 68 IN | SYSTOLIC BLOOD PRESSURE: 137 MMHG | BODY MASS INDEX: 27.28 KG/M2 | WEIGHT: 180 LBS | TEMPERATURE: 97 F | DIASTOLIC BLOOD PRESSURE: 85 MMHG | HEART RATE: 78 BPM | OXYGEN SATURATION: 99 % | RESPIRATION RATE: 16 BRPM

## 2023-10-09 DIAGNOSIS — Z11.59 NEED FOR HEPATITIS C SCREENING TEST: ICD-10-CM

## 2023-10-09 DIAGNOSIS — Z12.4 CERVICAL CANCER SCREENING: Primary | ICD-10-CM

## 2023-10-09 DIAGNOSIS — Z11.3 SCREEN FOR STD (SEXUALLY TRANSMITTED DISEASE): ICD-10-CM

## 2023-10-09 DIAGNOSIS — Z13.220 LIPID SCREENING: ICD-10-CM

## 2023-10-09 DIAGNOSIS — G43.809 OTHER MIGRAINE WITHOUT STATUS MIGRAINOSUS, NOT INTRACTABLE: ICD-10-CM

## 2023-10-09 DIAGNOSIS — R87.810 CERVICAL HIGH RISK HPV (HUMAN PAPILLOMAVIRUS) TEST POSITIVE: ICD-10-CM

## 2023-10-09 DIAGNOSIS — Z11.4 SCREENING FOR HIV (HUMAN IMMUNODEFICIENCY VIRUS): ICD-10-CM

## 2023-10-09 LAB
ANION GAP SERPL CALCULATED.3IONS-SCNC: 11 MMOL/L (ref 7–15)
BASO+EOS+MONOS # BLD AUTO: NORMAL 10*3/UL
BASO+EOS+MONOS NFR BLD AUTO: NORMAL %
BASOPHILS # BLD AUTO: 0 10E3/UL (ref 0–0.2)
BASOPHILS NFR BLD AUTO: 0 %
BUN SERPL-MCNC: 9.1 MG/DL (ref 6–20)
C TRACH DNA SPEC QL PROBE+SIG AMP: NEGATIVE
CALCIUM SERPL-MCNC: 9 MG/DL (ref 8.6–10)
CHLORIDE SERPL-SCNC: 103 MMOL/L (ref 98–107)
CHOLEST SERPL-MCNC: 139 MG/DL
CREAT SERPL-MCNC: 0.81 MG/DL (ref 0.51–0.95)
DEPRECATED HCO3 PLAS-SCNC: 24 MMOL/L (ref 22–29)
EGFRCR SERPLBLD CKD-EPI 2021: >90 ML/MIN/1.73M2
EOSINOPHIL # BLD AUTO: 0.5 10E3/UL (ref 0–0.7)
EOSINOPHIL NFR BLD AUTO: 7 %
ERYTHROCYTE [DISTWIDTH] IN BLOOD BY AUTOMATED COUNT: 11.7 % (ref 10–15)
GLUCOSE SERPL-MCNC: 102 MG/DL (ref 70–99)
HCT VFR BLD AUTO: 39.2 % (ref 35–47)
HDLC SERPL-MCNC: 47 MG/DL
HGB BLD-MCNC: 13.3 G/DL (ref 11.7–15.7)
HIV 1+2 AB+HIV1 P24 AG SERPL QL IA: NONREACTIVE
IMM GRANULOCYTES # BLD: 0 10E3/UL
IMM GRANULOCYTES NFR BLD: 0 %
LDLC SERPL CALC-MCNC: 81 MG/DL
LYMPHOCYTES # BLD AUTO: 1.7 10E3/UL (ref 0.8–5.3)
LYMPHOCYTES NFR BLD AUTO: 28 %
MCH RBC QN AUTO: 29.7 PG (ref 26.5–33)
MCHC RBC AUTO-ENTMCNC: 33.9 G/DL (ref 31.5–36.5)
MCV RBC AUTO: 88 FL (ref 78–100)
MONOCYTES # BLD AUTO: 0.3 10E3/UL (ref 0–1.3)
MONOCYTES NFR BLD AUTO: 5 %
N GONORRHOEA DNA SPEC QL NAA+PROBE: NEGATIVE
NEUTROPHILS # BLD AUTO: 3.7 10E3/UL (ref 1.6–8.3)
NEUTROPHILS NFR BLD AUTO: 59 %
NONHDLC SERPL-MCNC: 92 MG/DL
PLATELET # BLD AUTO: 241 10E3/UL (ref 150–450)
POTASSIUM SERPL-SCNC: 4.2 MMOL/L (ref 3.4–5.3)
RBC # BLD AUTO: 4.48 10E6/UL (ref 3.8–5.2)
SODIUM SERPL-SCNC: 138 MMOL/L (ref 135–145)
TRIGL SERPL-MCNC: 57 MG/DL
TSH SERPL DL<=0.005 MIU/L-ACNC: 0.83 UIU/ML (ref 0.3–4.2)
WBC # BLD AUTO: 6.2 10E3/UL (ref 4–11)

## 2023-10-09 PROCEDURE — 36415 COLL VENOUS BLD VENIPUNCTURE: CPT | Performed by: PHYSICIAN ASSISTANT

## 2023-10-09 PROCEDURE — 84443 ASSAY THYROID STIM HORMONE: CPT | Performed by: PHYSICIAN ASSISTANT

## 2023-10-09 PROCEDURE — 87624 HPV HI-RISK TYP POOLED RSLT: CPT | Performed by: PHYSICIAN ASSISTANT

## 2023-10-09 PROCEDURE — 85025 COMPLETE CBC W/AUTO DIFF WBC: CPT | Performed by: PHYSICIAN ASSISTANT

## 2023-10-09 PROCEDURE — 87491 CHLMYD TRACH DNA AMP PROBE: CPT | Performed by: PHYSICIAN ASSISTANT

## 2023-10-09 PROCEDURE — 86803 HEPATITIS C AB TEST: CPT | Performed by: PHYSICIAN ASSISTANT

## 2023-10-09 PROCEDURE — 80061 LIPID PANEL: CPT | Performed by: PHYSICIAN ASSISTANT

## 2023-10-09 PROCEDURE — 80048 BASIC METABOLIC PNL TOTAL CA: CPT | Performed by: PHYSICIAN ASSISTANT

## 2023-10-09 PROCEDURE — 87591 N.GONORRHOEAE DNA AMP PROB: CPT | Performed by: PHYSICIAN ASSISTANT

## 2023-10-09 PROCEDURE — G0145 SCR C/V CYTO,THINLAYER,RESCR: HCPCS | Performed by: PHYSICIAN ASSISTANT

## 2023-10-09 PROCEDURE — 99214 OFFICE O/P EST MOD 30 MIN: CPT | Performed by: PHYSICIAN ASSISTANT

## 2023-10-09 PROCEDURE — 87389 HIV-1 AG W/HIV-1&-2 AB AG IA: CPT | Performed by: PHYSICIAN ASSISTANT

## 2023-10-09 RX ORDER — RIZATRIPTAN BENZOATE 10 MG/1
10 TABLET ORAL
Qty: 18 TABLET | Refills: 5 | Status: SHIPPED | OUTPATIENT
Start: 2023-10-09

## 2023-10-09 ASSESSMENT — ENCOUNTER SYMPTOMS
FREQUENCY: 0
BREAST MASS: 0
DIZZINESS: 0
NERVOUS/ANXIOUS: 0
JOINT SWELLING: 0
MYALGIAS: 0
HEARTBURN: 0
DIARRHEA: 0
NAUSEA: 0
SHORTNESS OF BREATH: 0
PALPITATIONS: 0
SORE THROAT: 0
PARESTHESIAS: 0
DYSURIA: 0
CHILLS: 0
ABDOMINAL PAIN: 0
HEMATURIA: 0
FEVER: 0
HEMATOCHEZIA: 0
HEADACHES: 1
CONSTIPATION: 0
EYE PAIN: 1
ARTHRALGIAS: 0
COUGH: 0
WEAKNESS: 0

## 2023-10-09 ASSESSMENT — PATIENT HEALTH QUESTIONNAIRE - PHQ9
SUM OF ALL RESPONSES TO PHQ QUESTIONS 1-9: 1
10. IF YOU CHECKED OFF ANY PROBLEMS, HOW DIFFICULT HAVE THESE PROBLEMS MADE IT FOR YOU TO DO YOUR WORK, TAKE CARE OF THINGS AT HOME, OR GET ALONG WITH OTHER PEOPLE: NOT DIFFICULT AT ALL
SUM OF ALL RESPONSES TO PHQ QUESTIONS 1-9: 1

## 2023-10-09 ASSESSMENT — PAIN SCALES - GENERAL: PAINLEVEL: NO PAIN (0)

## 2023-10-09 NOTE — NURSING NOTE
"Chief Complaint   Patient presents with    Gyn Exam     Pap re-test and STI check    Headache       Initial /85   Pulse 78   Temp 97  F (36.1  C) (Tympanic)   Resp 16   Ht 1.734 m (5' 8.25\")   Wt 81.6 kg (180 lb)   LMP  (LMP Unknown)   SpO2 99%   Breastfeeding No   BMI 27.17 kg/m   Estimated body mass index is 27.17 kg/m  as calculated from the following:    Height as of this encounter: 1.734 m (5' 8.25\").    Weight as of this encounter: 81.6 kg (180 lb).  Medication Reconciliation: complete    IVY Cabral MA    "

## 2023-10-09 NOTE — PROGRESS NOTES
"  Assessment & Plan     Cervical cancer screening  Cervical high risk HPV (human papillomavirus) test positive  Repeat Pap today due to HPV positive last year.   - Pap Screen with HPV - recommended age 30 - 65 years    Other migraine without status migrainosus, not intractable  Continue to treat dry eyes and log headaches.   Trial of maxalt   Side effects and how to take the medication discussed.  - Basic metabolic panel  (Ca, Cl, CO2, Creat, Gluc, K, Na, BUN); Future  - TSH with free T4 reflex; Future  - CBC with platelets and differential; Future  - rizatriptan (MAXALT) 10 MG tablet; Take 1 tablet (10 mg) by mouth at onset of headache for migraine May repeat in 2 hours. Max 3 tablets/24 hours.  - Basic metabolic panel  (Ca, Cl, CO2, Creat, Gluc, K, Na, BUN)  - TSH with free T4 reflex  - CBC with platelets and differential    Screening for HIV (human immunodeficiency virus)  - HIV Antigen Antibody Combo; Future  - HIV Antigen Antibody Combo    Need for hepatitis C screening test  - Hepatitis C Screen Reflex to HCV RNA Quant and Genotype; Future  - Hepatitis C Screen Reflex to HCV RNA Quant and Genotype    Screen for STD (sexually transmitted disease)  - Chlamydia trachomatis/Neisseria gonorrhoeae by PCR - Clinic Collect    Lipid screening  - Lipid panel reflex to direct LDL Fasting; Future  - Lipid panel reflex to direct LDL Fasting             BMI:   Estimated body mass index is 27.17 kg/m  as calculated from the following:    Height as of this encounter: 1.734 m (5' 8.25\").    Weight as of this encounter: 81.6 kg (180 lb).   Weight management plan: Discussed healthy diet and exercise guidelines        Kristen M. Kehr, PA-C M [facility] ELLIE Polanco is a 43 year old, presenting for the following health issues:  Gyn Exam (Pap re-test and STI check) and Headache        10/9/2023     9:53 AM   Additional Questions   Roomed by Chris ALLEN MA       HPI     Repeat Pap due to positive HPV, sti " "check, headache    She has a new sexual partner and requests STD screening today.     She has been having more migraine like headaches. She was seen by the Optometrist and has been treated for dry eyes. She reports a pattern to the headaches. They occur one week out of the month. The pain will start behind the right eye. It will progress to the point that she has to take ibuprofen and lay down. They will dull, but sometimes do not fully resolve. This is when the headaches will last for days. She does not have vision changes, nausea or vomiting associated. The pain is always unilateral on the right. Starts behind the right eye. 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                 Review of Systems   Constitutional, HEENT, cardiovascular, pulmonary, GI, , musculoskeletal, neuro, skin, endocrine and psych systems are negative, except as otherwise noted.      Objective    /85   Pulse 78   Temp 97  F (36.1  C) (Tympanic)   Resp 16   Ht 1.734 m (5' 8.25\")   Wt 81.6 kg (180 lb)   LMP  (LMP Unknown)   SpO2 99%   Breastfeeding No   BMI 27.17 kg/m    Body mass index is 27.17 kg/m .  Physical Exam   GENERAL: healthy, alert and no distress  EYES: Eyes grossly normal to inspection, PERRL and conjunctivae and sclerae normal   (female): normal female external genitalia, normal urethral meatus, vaginal mucosa pink, moist, well rugated, and normal cervix/adnexa/uterus without masses or discharge  MS: no gross musculoskeletal defects noted, no edema  SKIN: no suspicious lesions or rashes  NEURO: Normal strength and tone, mentation intact and speech normal  PSYCH: mentation appears normal, affect normal/bright                      "

## 2023-10-10 LAB — HCV AB SERPL QL IA: NONREACTIVE

## 2023-10-13 ENCOUNTER — MYC MEDICAL ADVICE (OUTPATIENT)
Dept: FAMILY MEDICINE | Facility: CLINIC | Age: 43
End: 2023-10-13
Payer: COMMERCIAL

## 2023-10-16 ENCOUNTER — PATIENT OUTREACH (OUTPATIENT)
Dept: FAMILY MEDICINE | Facility: CLINIC | Age: 43
End: 2023-10-16
Payer: COMMERCIAL

## 2023-10-30 NOTE — PATIENT INSTRUCTIONS
If you have any questions regarding your visit, Please contact your care team.     [employer]: [phone]    To Schedule an Appointment 24/7  Call: [phone] HOU[facility] TELEPHONE NUMBER     Manuel Barajas MD  Medical Director        Karlene-MELCHOR Lenz-RN  Addie Rojo-Surgery Scheduler  Belle-Surgery Scheduler               Tuesday-[place]  7:30 a.m-4:30 p.m    Thursday-[place]  7:30 a.m-4:30 p.m    Typical Surgery Days: Tuesday or Friday [facility]  [address] [place] [address]  PH: [phone]    Imaging Scheduling all locations  PH: [phone]     [facility] Delivery  [address] [place] [address] [place] [address]  PH: [phone]    [facility]  [address] [place] [address]  PH: [phone] Fax      **Surgeries** Our Surgery Schedulers will contact you to schedule. If you do not receive a call within 3 business days, please call [phone].    Urgent Care locations:  [facility] Monday-Friday  10 am - 8 pm  Saturday and Sunday   9 am - 5 pm  Monday-Friday   10 am - 8 pm  Saturday and Sunday   9 am - 5 pm   [phone]   If you need a medication refill, please contact your pharmacy. Please allow 3 business days for your refill to be completed.  As always, Thank you for trusting us with your healthcare needs!    see additional instructions from your care team below

## 2023-11-02 ENCOUNTER — OFFICE VISIT (OUTPATIENT)
Dept: OBGYN | Facility: CLINIC | Age: 43
End: 2023-11-02
Payer: COMMERCIAL

## 2023-11-02 VITALS
SYSTOLIC BLOOD PRESSURE: 138 MMHG | WEIGHT: 180 LBS | BODY MASS INDEX: 27.17 KG/M2 | OXYGEN SATURATION: 99 % | HEART RATE: 93 BPM | DIASTOLIC BLOOD PRESSURE: 80 MMHG

## 2023-11-02 DIAGNOSIS — R87.810 CERVICAL HIGH RISK HPV (HUMAN PAPILLOMAVIRUS) TEST POSITIVE: Primary | ICD-10-CM

## 2023-11-02 PROCEDURE — 57452 EXAM OF CERVIX W/SCOPE: CPT | Performed by: OBSTETRICS & GYNECOLOGY

## 2023-11-02 NOTE — PROGRESS NOTES
Sherri presents for colposcopy. Pap showed: Normal HR HPV+.     PMH/PSH/Meds/Allergies reviewed & documented in [facility] [place].    I discussed with the patient the results of her pap smear and/or HPV studies.    I discussed our current understanding of abnormal cytology and the role hpv can play in pre-cancerous cervical change.  I explained the current cytological/histologic terminology.      We discussed the screening nature of pap smears and the need for a more definitive examination.    She is given the opportunity to ask questions and have them answered.  She does agree to proceed with colposcopy.    Procedure for colposcopy and biopsy has been explained to the   patient and consent obtained.    PROCEDURE:  Speculum placed in vagina and excellent visualization of cervix achieved, cervix swabbed  with acetic acid solution.    FINDINGS:  Cervix: no visible lesions, no mosaicism, no punctation, and no abnormal vasculature; SCJ visualized 360 degrees without lesions and no biopsies taken.    Vaginal inspection: vaginal colposcopy not performed.    Vulvar colposcopy: vulvar colposcopy not performed.    Procedure Summary: Patient tolerated procedure well and colposcopy adequate.    Colposcopic Impression: HPV effect    15 minutes of time was spent in discussion regarding her pap and HPV status.  This was in addition to the time required for the procedure.     Plan:  Co-testing 1 year.    Manuel Barajas MD FACOG

## 2023-11-30 ENCOUNTER — ANCILLARY PROCEDURE (OUTPATIENT)
Dept: MAMMOGRAPHY | Facility: CLINIC | Age: 43
End: 2023-11-30
Attending: PHYSICIAN ASSISTANT
Payer: COMMERCIAL

## 2023-11-30 DIAGNOSIS — Z12.31 VISIT FOR SCREENING MAMMOGRAM: ICD-10-CM

## 2023-11-30 PROCEDURE — 77067 SCR MAMMO BI INCL CAD: CPT | Mod: TC | Performed by: RADIOLOGY

## 2023-11-30 PROCEDURE — 77063 BREAST TOMOSYNTHESIS BI: CPT | Mod: TC | Performed by: RADIOLOGY

## 2023-12-11 ENCOUNTER — PATIENT OUTREACH (OUTPATIENT)
Dept: OBGYN | Facility: CLINIC | Age: 43
End: 2023-12-11
Payer: COMMERCIAL

## 2023-12-14 ENCOUNTER — OFFICE VISIT (OUTPATIENT)
Dept: URGENT CARE | Facility: URGENT CARE | Age: 43
End: 2023-12-14
Payer: COMMERCIAL

## 2023-12-14 VITALS
HEART RATE: 88 BPM | OXYGEN SATURATION: 99 % | DIASTOLIC BLOOD PRESSURE: 77 MMHG | BODY MASS INDEX: 27.62 KG/M2 | RESPIRATION RATE: 16 BRPM | WEIGHT: 183 LBS | TEMPERATURE: 99 F | SYSTOLIC BLOOD PRESSURE: 132 MMHG

## 2023-12-14 DIAGNOSIS — M54.41 ACUTE BILATERAL LOW BACK PAIN WITH RIGHT-SIDED SCIATICA: ICD-10-CM

## 2023-12-14 DIAGNOSIS — M54.42 ACUTE BILATERAL LOW BACK PAIN WITH LEFT-SIDED SCIATICA: Primary | ICD-10-CM

## 2023-12-14 PROCEDURE — 99213 OFFICE O/P EST LOW 20 MIN: CPT | Performed by: FAMILY MEDICINE

## 2023-12-14 RX ORDER — PREDNISONE 20 MG/1
40 TABLET ORAL DAILY
Qty: 10 TABLET | Refills: 0 | Status: SHIPPED | OUTPATIENT
Start: 2023-12-14 | End: 2023-12-19

## 2023-12-14 RX ORDER — CYCLOBENZAPRINE HCL 10 MG
10 TABLET ORAL 2 TIMES DAILY PRN
Qty: 14 TABLET | Refills: 0 | Status: SHIPPED | OUTPATIENT
Start: 2023-12-14

## 2023-12-14 NOTE — PROGRESS NOTES
URGENT CARE    ASSESSMENT AND PLAN:      ICD-10-CM    1. Acute bilateral low back pain with left-sided sciatica  M54.42 cyclobenzaprine (FLEXERIL) 10 MG tablet     predniSONE (DELTASONE) 20 MG tablet      2. Acute bilateral low back pain with right-sided sciatica  M54.41 cyclobenzaprine (FLEXERIL) 10 MG tablet     predniSONE (DELTASONE) 20 MG tablet              Differential diagnosis for back pain includes muscle spasm/strain, slipped disc w/ radicular pain including sciatica, slipped disc w/ cauda equina syndrome,vertebral fracture, vertebral tumor, epidural abscess / discitis, or pyelonephritis.    Based on history and exam, the most likely etiology of this patient's back pain is muscle spasm/strain.  Emergent MRI is not indicated as this patient does not have new weakness,  or cauda equina syndrome, or bowel or bladder incontinence.  We opted to defer UA testing at this time and discussed clear indications of returning back to the clinic if she should develop this.  Prescription for steroid burst to aid with inflammation and Flexeril for muscle relaxant sent to local pharmacy; effects of both medications discussed with patient.  Supportive measures also outlined in after visit summary and discussed.    Patient to follow up with PCP if no improvement over the next 7-14 days.  Will seek emergency care with new weakness/paresthesias, loss of continence, fever or worsening symptoms.      Patient verbalized understanding and is agreeable to plan. The patient was discharged ambulatory and in stable condition.      Subjective     Sherri Milton is a 43 year old female who presents with low back pain for 3 days.  Pain is positional with bending and lifting and is with radiation down the buttocks and thighs.   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   Prior history of back problems: recurrent self limited episodes of low back pain in the past with last episode 06/2023.  She was  participating in physical therapy during the summer but did not have time for this fall and discontinued.  There is no fever/chills, urinary symptoms, numbness in the legs, change in bowel/bladder, or cauda equina symptoms.  No recent trauma/injury.     She has a history of UTIs and we discussed in detail if she was experiencing any urinary symptoms but declines current symptoms are similar to previous UTI infections.    Using Flexeril without relief.     OTC: ice, icy hot patches, & NSAIDs     Review of Systems  All systems reviewed and negative except per HPI.        Objective    /77   Pulse 88   Temp 99  F (37.2  C) (Tympanic)   Resp 16   Wt 83 kg (183 lb)   SpO2 99%   BMI 27.62 kg/m      Physical Exam   GENERAL: healthy, alert and no distress  NECK: no adenopathy, no asymmetry, masses, or scars and thyroid normal to palpation  RESP: lungs clear to auscultation - no rales, rhonchi or wheezes  CV: regular rate and rhythm, normal S1 S2, no S3 or S4, no murmur, click or rub, peripheral pulses strong  ABDOMEN: soft, nontender, no hepatosplenomegaly, no masses   NEURO: Normal strength and tone, mentation intact and speech normal  Comprehensive back pain exam:  Tenderness of bilateral lumbar without midline tenderness, Pain limits the following motions: flexion and extension, Lower extremity strength functional and equal on both sides, Lower extremity reflexes within normal limits bilaterally, and Lower extremity sensation normal and equal on both sides

## 2024-01-10 ENCOUNTER — OFFICE VISIT (OUTPATIENT)
Dept: FAMILY MEDICINE | Facility: CLINIC | Age: 44
End: 2024-01-10
Payer: COMMERCIAL

## 2024-01-10 VITALS
OXYGEN SATURATION: 100 % | BODY MASS INDEX: 27.13 KG/M2 | RESPIRATION RATE: 16 BRPM | DIASTOLIC BLOOD PRESSURE: 73 MMHG | HEART RATE: 82 BPM | HEIGHT: 68 IN | TEMPERATURE: 97.6 F | WEIGHT: 179 LBS | SYSTOLIC BLOOD PRESSURE: 116 MMHG

## 2024-01-10 DIAGNOSIS — G89.29 CHRONIC BILATERAL LOW BACK PAIN, UNSPECIFIED WHETHER SCIATICA PRESENT: Primary | ICD-10-CM

## 2024-01-10 DIAGNOSIS — M54.50 CHRONIC BILATERAL LOW BACK PAIN, UNSPECIFIED WHETHER SCIATICA PRESENT: Primary | ICD-10-CM

## 2024-01-10 DIAGNOSIS — M54.2 CHRONIC NECK PAIN: ICD-10-CM

## 2024-01-10 DIAGNOSIS — G89.29 CHRONIC NECK PAIN: ICD-10-CM

## 2024-01-10 PROCEDURE — 99214 OFFICE O/P EST MOD 30 MIN: CPT | Performed by: PHYSICIAN ASSISTANT

## 2024-01-10 ASSESSMENT — PAIN SCALES - GENERAL: PAINLEVEL: MILD PAIN (2)

## 2024-01-10 NOTE — PROGRESS NOTES
Assessment & Plan     Chronic bilateral low back pain, unspecified whether sciatica present  Chronic neck pain  She is going to schedule an MRI of her cervical and lumbar spine.   Plan to schedule with Medical Spine Specialist after results are back.   Massage, chiropractor and therapy already tried.   Continue with the flexeril, NSAIDS, heat / cold, rest with episodic flare up for now.   - MR Lumbar Spine w/o Contrast; Future  - Spine  Referral; Future  - MR Cervical Spine w/o Contrast; Future  - Spine  Referral; Future                 Kristen M. Kehr, PA-C [facility]   Sherri is a 43 year old, presenting for the following health issues:  Back Pain and Neck Problem (Radiates down to right shoulder down to fingers with pain and numbness. )        1/10/2024    10:13 AM   Additional Questions   Roomed by Chris ALLEN MA       History of Present Illness       Back Pain:  She presents for follow up of back pain. Patient's back pain is a recurring problem.  Location of back pain:  Right lower back, left lower back, right side of neck, right shoulder and right hip  Description of back pain: dull ache, sharp, shooting and stabbing  Back pain spreads: right thigh, left thigh, right shoulder and right side of neck    Since patient first noticed back pain, pain is: always present, but gets better and worse  Does back pain interfere with her job:  Yes       She eats 2-3 servings of fruits and vegetables daily.She consumes 0 sweetened beverage(s) daily.She exercises with enough effort to increase her heart rate 30 to 60 minutes per day.  She exercises with enough effort to increase her heart rate 4 days per week. She is missing 1 dose(s) of medications per week.  She is not taking prescribed medications regularly due to remembering to take.       Sherri is here today to address her chronic neck and low back pain.     The neck pain has been ongoing since she was a teen. She has  "been seen by the chiropractor for years. There is some impingement in her right side due to radicular tingling and numbness that occurs. The chiropractor has been helpful off and on, but she is looking for more answers and long term management.     The low back pain started in June 2023 after a day of gardening. She had a muscle spasm and was treated with muscle relaxant, NSAIDS and rest. Symptoms improved, but she has has 3 other exacerbations since with no specific triggers. She has tried Physical Therapy, chiropractor and massage with some brief help with her pain and then she will have another episode. She will have some radiation into her hips with her back. The pain has been on both sides. There is no weakness / numbness or loss of bowel or bladder function associated.               Review of Systems   Constitutional, HEENT, cardiovascular, pulmonary, GI, , musculoskeletal, neuro, skin, endocrine and psych systems are negative, except as otherwise noted.      Objective    /73   Pulse 82   Temp 97.6  F (36.4  C) (Tympanic)   Resp 16   Ht 1.727 m (5' 8\")   Wt 81.2 kg (179 lb)   LMP  (LMP Unknown)   SpO2 100%   Breastfeeding No   BMI 27.22 kg/m    Body mass index is 27.22 kg/m .  Physical Exam   GENERAL: healthy, alert and no distress  MS: no gross musculoskeletal defects noted, no edema  SKIN: no suspicious lesions or rashes  NEURO: Normal strength and tone, mentation intact and speech normal  Comprehensive back pain exam:   tenderness is mild in both lumbar spinal muscles and over SI area, Range of motion not limited by pain, Lower extremity strength functional and equal on both sides, Lower extremity reflexes within normal limits bilaterally, Lower extremity sensation normal and equal on both sides, and Straight leg raise negative bilaterally  PSYCH: mentation appears normal, affect normal/bright                      "

## 2024-01-22 ENCOUNTER — ANCILLARY PROCEDURE (OUTPATIENT)
Dept: MRI IMAGING | Facility: CLINIC | Age: 44
End: 2024-01-22
Attending: PHYSICIAN ASSISTANT
Payer: COMMERCIAL

## 2024-01-22 DIAGNOSIS — M54.50 CHRONIC BILATERAL LOW BACK PAIN, UNSPECIFIED WHETHER SCIATICA PRESENT: ICD-10-CM

## 2024-01-22 DIAGNOSIS — M54.2 CHRONIC NECK PAIN: ICD-10-CM

## 2024-01-22 DIAGNOSIS — G89.29 CHRONIC NECK PAIN: ICD-10-CM

## 2024-01-22 DIAGNOSIS — G89.29 CHRONIC BILATERAL LOW BACK PAIN, UNSPECIFIED WHETHER SCIATICA PRESENT: ICD-10-CM

## 2024-01-22 PROCEDURE — 72141 MRI NECK SPINE W/O DYE: CPT | Mod: TC | Performed by: RADIOLOGY

## 2024-01-22 PROCEDURE — 72148 MRI LUMBAR SPINE W/O DYE: CPT | Mod: TC | Performed by: RADIOLOGY

## 2024-01-23 ENCOUNTER — TELEPHONE (OUTPATIENT)
Dept: PHYSICAL MEDICINE AND REHAB | Facility: CLINIC | Age: 44
End: 2024-01-23
Payer: COMMERCIAL

## 2024-01-23 NOTE — TELEPHONE ENCOUNTER
Pre-visit planning - Alysia Hartley CNP    LVM reminding of check-in time for appointment on 1/29 at 8am.     ABIDA CouchT

## 2024-01-29 ENCOUNTER — OFFICE VISIT (OUTPATIENT)
Dept: PHYSICAL MEDICINE AND REHAB | Facility: CLINIC | Age: 44
End: 2024-01-29
Payer: COMMERCIAL

## 2024-01-29 VITALS
SYSTOLIC BLOOD PRESSURE: 115 MMHG | HEIGHT: 68 IN | DIASTOLIC BLOOD PRESSURE: 66 MMHG | HEART RATE: 80 BPM | OXYGEN SATURATION: 100 % | WEIGHT: 179 LBS | BODY MASS INDEX: 27.13 KG/M2 | RESPIRATION RATE: 18 BRPM

## 2024-01-29 DIAGNOSIS — M54.2 CHRONIC NECK PAIN: ICD-10-CM

## 2024-01-29 DIAGNOSIS — M51.369 BULGING LUMBAR DISC: ICD-10-CM

## 2024-01-29 DIAGNOSIS — G89.29 CHRONIC BILATERAL LOW BACK PAIN WITH LEFT-SIDED SCIATICA: Primary | ICD-10-CM

## 2024-01-29 DIAGNOSIS — G89.29 CHRONIC NECK PAIN: ICD-10-CM

## 2024-01-29 DIAGNOSIS — G89.29 CHRONIC BILATERAL LOW BACK PAIN, UNSPECIFIED WHETHER SCIATICA PRESENT: ICD-10-CM

## 2024-01-29 DIAGNOSIS — R20.0 NUMBNESS AND TINGLING: ICD-10-CM

## 2024-01-29 DIAGNOSIS — M54.50 CHRONIC BILATERAL LOW BACK PAIN, UNSPECIFIED WHETHER SCIATICA PRESENT: ICD-10-CM

## 2024-01-29 DIAGNOSIS — M54.42 CHRONIC BILATERAL LOW BACK PAIN WITH LEFT-SIDED SCIATICA: Primary | ICD-10-CM

## 2024-01-29 DIAGNOSIS — R20.2 POSITIVE TINEL'S SIGN: ICD-10-CM

## 2024-01-29 DIAGNOSIS — R20.2 NUMBNESS AND TINGLING: ICD-10-CM

## 2024-01-29 DIAGNOSIS — M54.9 UPPER BACK PAIN: ICD-10-CM

## 2024-01-29 DIAGNOSIS — M54.16 LUMBAR RADICULOPATHY: ICD-10-CM

## 2024-01-29 PROCEDURE — 99417 PROLNG OP E/M EACH 15 MIN: CPT | Performed by: NURSE PRACTITIONER

## 2024-01-29 PROCEDURE — 99205 OFFICE O/P NEW HI 60 MIN: CPT | Performed by: NURSE PRACTITIONER

## 2024-01-29 ASSESSMENT — PAIN SCALES - GENERAL: PAINLEVEL: MILD PAIN (3)

## 2024-01-29 NOTE — PROGRESS NOTES
Patient seen at the request of Kristen M Kehr for an opinion and evaluation of neck and back pain.      HISTORY OF PRESENT ILLNESS:  Sherri Milton is a 43 year old female who presents with a chief complaint of neck and low back pain.     She was referred to the spine clinic after a family medicine clinic visit 1/10/2024.    She was seen today in the clinic.  She describes both chronic neck and low back pain.  The low back pain is the most bothersome for her lately.    Neck pain  She reports neck, upper back, and right upper extremity symptoms since she was a teenager.  She says that her neck and upper back R>L feel very tight.   She has intermittent tingling extending from her right elbow into the right fourth and fifth digits.  She experiences flares of the neck and upper extremity pain around once a month.  She says that many years ago she had an EMG of both arms which was unrevealing.  She has tried multiple treatments for the pain over the years, including regular massages and chiropractic treatments.  She notes that with chiropractic adjustments she sometimes has a shocklike pain radiating from her neck down her right arm.  Aggravating factors for the neck pain include: gardening, more activity  Relieving factors include: massage, chiropractor   Today she rates the neck discomfort 3/10.  At its most severe, the pain reaches 6/10.  She uses ibuprofen as needed for more severe pain.  Of note, she also reports a history of right rotator cuff strain 3 years ago.  At the time she did virtual visit physical therapy.  She endorses some degree of ongoing right shoulder pain.    Low back pain  She reports low back pain that started last June after landscaping and pulling up sod.  She experienced severe pain the next day and went to urgent care and tried a muscle relaxer (cyclobenzaprine).  Since that time, she had 2 more flares of pain (once in July after standing at a festival, and another episode after prolonged  standing baking cookies in December).  She says that it took 2 to 3 days to recover after the flares of pain.  She had some degree of persistent low back pain between the flares of pain as well.   She describes low back pain that alternates from side-to-side.  Lately the left side of the low back has been bothering her more.  She has intermittent pain radiating from the left side of her back to the posterior left thigh (terminating around the mid thigh).  Aggravating factors for the low back pain include: Lifting or standing  Relieving factors include: ice, ibuprofen, MSK relaxer, rest, prednisone (for flare of pain in December)  She has tried virtual visit physical therapy for the low back pain.    She denies balance problems, difficulty with fine motor tasks such as manipulating buttons or zippers, falls, weakness, bowel or bladder incontinence, saddle anesthesia, unintentional weight loss, fevers/chills, or night sweats.       PRIOR INJURIES/TREATMENT:   Ice/Heat: ice helps her low back. Uses heat for upper back    Brace: none    Physical Therapy:   PT for right shoulder 3 years       - Current Pain Medications -   Cyclobenzaprine - PRN during flares of pain  Ibuprofen  Lidocaine patches or roll on  rizatriptan    - Prior/Trialed Pain Medications -   none    Prior Procedures:  Date    Procedure   Improvement (%)  none              Prior Related Surgery: none     Other (acupuncture, OMT, CMM, TENS, DME, etc.):   Monthly massages  Chiropractic - most recently in early 2023    Specialists Seen - (with most recent, available notes and clinic visits reviewed)   1. none     IMAGING - reviewed   MR CERVICAL SPINE W/O CONTRAST  1/22/2024   FINDINGS:     Minimal anterolisthesis at C5-6. Normal marrow signal. No cord signal  abnormality. Diffusion-weighted images are negative for focal  abnormality.     The findings on a level by level basis are as follows:     C2-3: No spinal canal or neural foraminal stenosis.     C3-4:  No spinal canal or neural foraminal stenosis.     C4-5: No spinal canal or neural foraminal stenosis.     C5-6: Disc osteophyte complex and mild bilateral facet arthrosis. No  spinal canal or neural foraminal stenosis.     C6-7: Left subarticular protrusion. No spinal canal or neural  foraminal stenosis.     C7-T1: No spinal canal or neural foraminal stenosis.     The visualized skull base, posterior fossa, and paraspinal soft  tissues are within normal limits.                                                              IMPRESSION:  Mild spondylosis without high-grade spinal canal or neural foraminal  stenosis. Left subarticular protrusion at C6-7.       MR LUMBAR SPINE W/O CONTRAST 1/22/2024   Findings:   There are 5 lumbar-type vertebrae.  The tip of the conus medullaris is  at L1.  Normal lumbar vertebral alignment.  There is mild disc height  narrowing and disc dessication at L5-S1.  Normal marrow signal.     On a level by level basis:     T12-L1: No spinal canal or neuroforaminal stenosis.     L1-2: No spinal canal or neuroforaminal stenosis.     L2-3: No spinal canal or neuroforaminal stenosis.     L3-4: Bilateral facet hypertrophy. No spinal canal or neuroforaminal  stenosis.     L4-5: Bilateral facet hypertrophy. No spinal canal or neuroforaminal  stenosis.     L5-S1: Central protrusion effacing the lateral recesses and abutting  the descending bilateral S1 nerve roots. No spinal canal or  neuroforaminal stenosis.     Paraspinous tissues are within normal limits.                              Impression: No significant spinal canal or neural foraminal stenosis.  Central protrusion at L5-S1 effaces the lateral recesses and abuts the  descending bilateral S1 nerve roots.         Review Of Systems:  I am responding to those symptoms which are directly relevant to the specific indication for my consultation. I recommend that the patient follow up with their primary or referring provider to pursue any other  "symptoms which may be of concern.       Medical History:  She  has a past medical history of Arthritis (June 2023), Depression, Depressive disorder (1995), Factor II deficiency (H), Herpes simplex, and Uncomplicated asthma (1995).     She has a past surgical history that includes appendectomy.    Family History  Her family history includes Allergies in her son; Asthma in her mother and son; Breast Cancer in some other family members; Cerebrovascular Disease in her maternal grandfather; Diabetes in her maternal grandmother and paternal grandfather; Heart Disease in her maternal grandfather and paternal grandfather; Heart Failure in her maternal grandmother and paternal grandmother; Hyperlipidemia in her father and mother; Hypertension in her father and mother; Lymphoma in her father; Other - See Comments in her mother and sister; Other Cancer in her father.     Social History:  Work: , sits/stands/uses treadmill   Current living situation: Lives with her kids. Performs ADLs/IADLs independently.      She  reports that she quit smoking about 23 years ago. Her smoking use included cigarettes. She started smoking about 26 years ago. She has a 0.3 pack-year smoking history. She has never been exposed to tobacco smoke. She has never used smokeless tobacco. She reports current alcohol use. She reports current drug use. Drug: Marijuana.        Current Medications:   She has a current medication list which includes the following prescription(s): cetirizine, cyclobenzaprine, levonorgestrel, rizatriptan, and valacyclovir.     Allergies:    -- Nuts -- Hives and Itching   -- Animal Dander -- Hives   -- Grass Extracts [Gramineae Pollens] -- Cough, Itching and Other (See                            Comments)    PHYSICAL EXAMINATION:  /66   Pulse 80   Resp 18   Ht 1.727 m (5' 8\")   Wt 81.2 kg (179 lb)   LMP  (LMP Unknown)   SpO2 100%   BMI 27.22 kg/m     --CONSTITUTIONAL: Vital signs as above. No acute " distress. The patient is well nourished and well groomed.  --PSYCHIATRIC: The patient is awake, alert, oriented to person, place, time and answering questions appropriately with clear speech. Appropriate mood and affect   --HEENT: Sclera are non-injected. Extraocular muscles are intact. Moist oral mucosa.  --SKIN: Skin over the face, bilateral upper extremities, bilateral lower extremities, and posterior torso is clean, dry, intact without rashes.   --RESPIRATORY: Normal rhythm and effort. No abnormal accessory muscle breathing patterns noted.   --GROSS MOTOR: Easily arises from a seated position. Toe walking and heel walking are normal.  Tandem gait normal.  Standing at counter for stability, she is able to stand on one leg and lift onto her toes on both sides, but has more difficulty on the left side  --CERVICAL SPINE: Inspection reveals no evidence of deformity. Range of motion is not limited in cervical flexion, extension, lateral rotation. No tenderness to palpation cervical spine.  Spurling maneuver negative bilaterally.  --STANDING EXAMINATION: Gait is non-antalgic. Normal lumbar lordosis noted, no lateral shift.  --SHOULDERS: Full range of motion bilaterally: abduction, flexion, cross chest movement internal/external rotation. Negative empty can.  --UPPER EXTREMITY MOTOR TESTING:  Wrist flexion left 5/5, right 5/5  Wrist extension left 5/5, right 5/5  Biceps left 5/5, right 5/5   Triceps left 5/5, right 5/5   Shoulder abduction left 5/5, right 5/5   left 5/5, right 5/5  Finger abduction left 5/5, right 5/5  -Tinels positive at right elbow, negative right wrist, negative at left wrist and elbow  --LOWER EXTREMITY MOTOR TESTING:  Plantar flexion left 5/5, right 5/5   Dorsiflexion left 5/5, right 5/5   Great toe MTP extension left 5/5, right 5/5  Knee flexion left 5/5, right 5/5  Knee extension left 5/5, right 5/5   Hip flexion left 4+/5 subtle, right 5/5  --MUSCULOSKELETAL: Lumbar spine inspection reveals  no evidence of deformity. Range of motion is not limited in lumbar flexion, extension, lateral rotation. No tenderness to palpation lumbar spine. Straight leg raise is positive bilat. Sciatic notch non-tender. Facet loading maneuvers are positive bilat.  --SACROILIAC JOINT: No pain with palpation of the SI joint.  Zain's negative.   Thigh thrust test neg bilat   --HIPS: Full range of motion bilaterally. Caitlyn's on left elicits mild left anterior hip discomfort, neg on right. No pain with logrolling. No pain with palpation of the greater trochanters.   --NEUROLOGIC: CN III-XII are grossly intact.   2/4 symmetric biceps & brachioradialis reflexes bilaterally. Sensation to upper extremities is intact EXCEPT right 4th and 5th digits.  Negative Garza's bilaterally.    2/4 patellar and achilles reflexes bilaterally.  Sensation to light touch is intact in the bilateral L4, L5, and S1 dermatomes. No clonus.    --VASCULAR:  Warm upper and lower limbs bilaterally. There is no pitting edema of the bilateral lower extremities.       ASSESSMENT:  Sherri Milton is a pleasant 43 year old female who presents with     # Neck and upper back pain R>L  # muscle spasm  # Tingling right elbow/forearm radiating to the right 4th and 5th digits  # positive tinels right medial epicondyle  # chronic intermittent right shoulder pain    # Low back pain L>R  # LLE pain in S1 type pattern      MRI of the cervical spine 1/22/24 shows:  -Minimal anterolisthesis at C5-6.   -Mild spondylosis, no high-grade SCS or NFS  -C6-7 left subarticular protrusion but no neuroforaminal stenosis or spinal canal stenosis    MRI of the lumbar spine 1/22/24 shows:  -Lower lumbar facet hypertrophy  -Disc protrusion at L5-S1 with lateral recess narrowing, abutting the descending bilateral S1 nerve roots    Differentials for neck/RUE pain includes ulnar neuropathy, muscle spasm/myofascial pain, cervical radiculopathy    Differential for low back pain includes: S1  lumbar radiculopathy, facet mediated pain, left intra-articular hip related pain    PLAN:  -MRIs of the cervical and lumbar spine were reviewed in detail with the patient today. There is disc protrusion abutting the bilateral S1 nerve roots at L5-S1 likely contributing to the low back and left leg symptoms she has described. Lumbar facet related pain may also be playing a role.   In regards to the neck pain, there is some straightening of the cervical lordosis, muscle spasm may be contributing. There is no significant degree of cervical level neuroforaminal stenosis. She has positive Tinel's on the right medial epicodyle. We discussed possible ulnar neuropathy contributing.   -She is interested in beginning a formal course of in person physical therapy for the neck and back pain.  The order was entered.  -She was interested in occupational therapy as well.  However, she prefers to begin PT first.  The order for OT was added so she may begin OT when her schedule allows  -In the interim, we discussed trying to avoid leaning on her right elbow and trying to maintain right elbow extension overnight, such as by loosely wrapping a towel around the right elbow during sleep  -To help differentiate cervical radiculopathy, ulnar neuropathy, double crush, will add an EMG of the right arm  -cont cyclobenzaprine as needed. Gabapentin could be considered. She felt pain was adequately controlled to begin PT at this time  -We discussed other options which could be considered in the future if she is having persistent pain and such as [facility]  - RTC after 6 weeks of PT, or sooner if needed    Ready to learn, no apparent learning barriers.  Education provided on treatment plan according to patient's preferred learning style.  Patient verbalizes understanding.   __________________________________  Alysia Hartley NP  Physical Medicine & Rehabilitation        94 minutes spent by me on the date of the encounter doing chart review, history and  exam, documentation and further activities per the note

## 2024-01-29 NOTE — LETTER
1/29/2024       RE: Sherri Milton  [address] [address] [facility] [address]     Dear Colleague,    Thank you for referring your patient, Sherri Milton, to the [facility] [place] [facility]. Please see a copy of my visit note below.    Patient seen at the request of Kristen M Kehr for an opinion and evaluation of neck and back pain.      HISTORY OF PRESENT ILLNESS:  Sherri Milton is a 43 year old female who presents with a chief complaint of neck and low back pain.     She was referred to the spine clinic after a family medicine clinic visit 1/10/2024.    She was seen today in the clinic.  She describes both chronic neck and low back pain.  The low back pain is the most bothersome for her lately.    Neck pain  She reports neck, upper back, and right upper extremity symptoms since she was a teenager.  She says that her neck and upper back R>L feel very tight.   She has intermittent tingling extending from her right elbow into the right fourth and fifth digits.  She experiences flares of the neck and upper extremity pain around once a month.  She says that many years ago she had an EMG of both arms which was unrevealing.  She has tried multiple treatments for the pain over the years, including regular massages and chiropractic treatments.  She notes that with chiropractic adjustments she sometimes has a shocklike pain radiating from her neck down her right arm.  Aggravating factors for the neck pain include: gardening, more activity  Relieving factors include: massage, chiropractor   Today she rates the neck discomfort 3/10.  At its most severe, the pain reaches 6/10.  She uses ibuprofen as needed for more severe pain.  Of note, she also reports a history of right rotator cuff strain 3 years ago.  At the time she did virtual visit physical therapy.  She endorses some degree of ongoing right shoulder pain.    Low back  pain  She reports low back pain that started last June after landscaping and pulling up sod.  She experienced severe pain the next day and went to urgent care and tried a muscle relaxer (cyclobenzaprine).  Since that time, she had 2 more flares of pain (once in July after standing at a festival, and another episode after prolonged standing baking cookies in December).  She says that it took 2 to 3 days to recover after the flares of pain.  She had some degree of persistent low back pain between the flares of pain as well.   She describes low back pain that alternates from side-to-side.  Lately the left side of the low back has been bothering her more.  She has intermittent pain radiating from the left side of her back to the posterior left thigh (terminating around the mid thigh).  Aggravating factors for the low back pain include: Lifting or standing  Relieving factors include: ice, ibuprofen, MSK relaxer, rest, prednisone (for flare of pain in December)  She has tried virtual visit physical therapy for the low back pain.    She denies balance problems, difficulty with fine motor tasks such as manipulating buttons or zippers, falls, weakness, bowel or bladder incontinence, saddle anesthesia, unintentional weight loss, fevers/chills, or night sweats.       PRIOR INJURIES/TREATMENT:   Ice/Heat: ice helps her low back. Uses heat for upper back    Brace: none    Physical Therapy:   PT for right shoulder 3 years       - Current Pain Medications -   Cyclobenzaprine - PRN during flares of pain  Ibuprofen  Lidocaine patches or roll on  rizatriptan    - Prior/Trialed Pain Medications -   none    Prior Procedures:  Date    Procedure   Improvement (%)  none              Prior Related Surgery: none     Other (acupuncture, OMT, CMM, TENS, DME, etc.):   Monthly massages  Chiropractic - most recently in early 2023    Specialists Seen - (with most recent, available notes and clinic visits reviewed)   1. none     IMAGING - reviewed    MR CERVICAL SPINE W/O CONTRAST  1/22/2024   FINDINGS:     Minimal anterolisthesis at C5-6. Normal marrow signal. No cord signal  abnormality. Diffusion-weighted images are negative for focal  abnormality.     The findings on a level by level basis are as follows:     C2-3: No spinal canal or neural foraminal stenosis.     C3-4: No spinal canal or neural foraminal stenosis.     C4-5: No spinal canal or neural foraminal stenosis.     C5-6: Disc osteophyte complex and mild bilateral facet arthrosis. No  spinal canal or neural foraminal stenosis.     C6-7: Left subarticular protrusion. No spinal canal or neural  foraminal stenosis.     C7-T1: No spinal canal or neural foraminal stenosis.     The visualized skull base, posterior fossa, and paraspinal soft  tissues are within normal limits.                                                              IMPRESSION:  Mild spondylosis without high-grade spinal canal or neural foraminal  stenosis. Left subarticular protrusion at C6-7.       MR LUMBAR SPINE W/O CONTRAST 1/22/2024   Findings:   There are 5 lumbar-type vertebrae.  The tip of the conus medullaris is  at L1.  Normal lumbar vertebral alignment.  There is mild disc height  narrowing and disc dessication at L5-S1.  Normal marrow signal.     On a level by level basis:     T12-L1: No spinal canal or neuroforaminal stenosis.     L1-2: No spinal canal or neuroforaminal stenosis.     L2-3: No spinal canal or neuroforaminal stenosis.     L3-4: Bilateral facet hypertrophy. No spinal canal or neuroforaminal  stenosis.     L4-5: Bilateral facet hypertrophy. No spinal canal or neuroforaminal  stenosis.     L5-S1: Central protrusion effacing the lateral recesses and abutting  the descending bilateral S1 nerve roots. No spinal canal or  neuroforaminal stenosis.     Paraspinous tissues are within normal limits.                              Impression: No significant spinal canal or neural foraminal stenosis.  Central protrusion  at L5-S1 effaces the lateral recesses and abuts the  descending bilateral S1 nerve roots.         Review Of Systems:  I am responding to those symptoms which are directly relevant to the specific indication for my consultation. I recommend that the patient follow up with their primary or referring provider to pursue any other symptoms which may be of concern.       Medical History:  She  has a past medical history of Arthritis (June 2023), Depression, Depressive disorder (1995), Factor II deficiency (H), Herpes simplex, and Uncomplicated asthma (1995).     She has a past surgical history that includes appendectomy.    Family History  Her family history includes Allergies in her son; Asthma in her mother and son; Breast Cancer in some other family members; Cerebrovascular Disease in her maternal grandfather; Diabetes in her maternal grandmother and paternal grandfather; Heart Disease in her maternal grandfather and paternal grandfather; Heart Failure in her maternal grandmother and paternal grandmother; Hyperlipidemia in her father and mother; Hypertension in her father and mother; Lymphoma in her father; Other - See Comments in her mother and sister; Other Cancer in her father.     Social History:  Work: , sits/stands/uses treadmill   Current living situation: Lives with her kids. Performs ADLs/IADLs independently.      She  reports that she quit smoking about 23 years ago. Her smoking use included cigarettes. She started smoking about 26 years ago. She has a 0.3 pack-year smoking history. She has never been exposed to tobacco smoke. She has never used smokeless tobacco. She reports current alcohol use. She reports current drug use. Drug: Marijuana.        Current Medications:   She has a current medication list which includes the following prescription(s): cetirizine, cyclobenzaprine, levonorgestrel, rizatriptan, and valacyclovir.     Allergies:    -- Nuts -- Hives and Itching   -- Animal Dander --  "Hives   -- Grass Extracts [Gramineae Pollens] -- Cough, Itching and Other (See                            Comments)    PHYSICAL EXAMINATION:  /66   Pulse 80   Resp 18   Ht 1.727 m (5' 8\")   Wt 81.2 kg (179 lb)   LMP  (LMP Unknown)   SpO2 100%   BMI 27.22 kg/m     --CONSTITUTIONAL: Vital signs as above. No acute distress. The patient is well nourished and well groomed.  --PSYCHIATRIC: The patient is awake, alert, oriented to person, place, time and answering questions appropriately with clear speech. Appropriate mood and affect   --HEENT: Sclera are non-injected. Extraocular muscles are intact. Moist oral mucosa.  --SKIN: Skin over the face, bilateral upper extremities, bilateral lower extremities, and posterior torso is clean, dry, intact without rashes.   --RESPIRATORY: Normal rhythm and effort. No abnormal accessory muscle breathing patterns noted.   --GROSS MOTOR: Easily arises from a seated position. Toe walking and heel walking are normal.  Tandem gait normal.  Standing at counter for stability, she is able to stand on one leg and lift onto her toes on both sides, but has more difficulty on the left side  --CERVICAL SPINE: Inspection reveals no evidence of deformity. Range of motion is not limited in cervical flexion, extension, lateral rotation. No tenderness to palpation cervical spine.  Spurling maneuver negative bilaterally.  --STANDING EXAMINATION: Gait is non-antalgic. Normal lumbar lordosis noted, no lateral shift.  --SHOULDERS: Full range of motion bilaterally: abduction, flexion, cross chest movement internal/external rotation. Negative empty can.  --UPPER EXTREMITY MOTOR TESTING:  Wrist flexion left 5/5, right 5/5  Wrist extension left 5/5, right 5/5  Biceps left 5/5, right 5/5   Triceps left 5/5, right 5/5   Shoulder abduction left 5/5, right 5/5   left 5/5, right 5/5  Finger abduction left 5/5, right 5/5  -Tinels positive at right elbow, negative right wrist, negative at left wrist " and elbow  --LOWER EXTREMITY MOTOR TESTING:  Plantar flexion left 5/5, right 5/5   Dorsiflexion left 5/5, right 5/5   Great toe MTP extension left 5/5, right 5/5  Knee flexion left 5/5, right 5/5  Knee extension left 5/5, right 5/5   Hip flexion left 4+/5 subtle, right 5/5  --MUSCULOSKELETAL: Lumbar spine inspection reveals no evidence of deformity. Range of motion is not limited in lumbar flexion, extension, lateral rotation. No tenderness to palpation lumbar spine. Straight leg raise is positive bilat. Sciatic notch non-tender. Facet loading maneuvers are positive bilat.  --SACROILIAC JOINT: No pain with palpation of the SI joint.  Zain's negative.   Thigh thrust test neg bilat   --HIPS: Full range of motion bilaterally. Caitlyn's on left elicits mild left anterior hip discomfort, neg on right. No pain with logrolling. No pain with palpation of the greater trochanters.   --NEUROLOGIC: CN III-XII are grossly intact.   2/4 symmetric biceps & brachioradialis reflexes bilaterally. Sensation to upper extremities is intact EXCEPT right 4th and 5th digits.  Negative Garza's bilaterally.    2/4 patellar and achilles reflexes bilaterally.  Sensation to light touch is intact in the bilateral L4, L5, and S1 dermatomes. No clonus.    --VASCULAR:  Warm upper and lower limbs bilaterally. There is no pitting edema of the bilateral lower extremities.       ASSESSMENT:  Sherri Milton is a pleasant 43 year old female who presents with     # Neck and upper back pain R>L  # muscle spasm  # Tingling right elbow/forearm radiating to the right 4th and 5th digits  # positive tinels right medial epicondyle  # chronic intermittent right shoulder pain    # Low back pain L>R  # LLE pain in S1 type pattern      MRI of the cervical spine 1/22/24 shows:  -Minimal anterolisthesis at C5-6.   -Mild spondylosis, no high-grade SCS or NFS  -C6-7 left subarticular protrusion but no neuroforaminal stenosis or spinal canal stenosis    MRI of the  lumbar spine 1/22/24 shows:  -Lower lumbar facet hypertrophy  -Disc protrusion at L5-S1 with lateral recess narrowing, abutting the descending bilateral S1 nerve roots    Differentials for neck/RUE pain includes ulnar neuropathy, muscle spasm/myofascial pain, cervical radiculopathy    Differential for low back pain includes: S1 lumbar radiculopathy, facet mediated pain, left intra-articular hip related pain    PLAN:  -MRIs of the cervical and lumbar spine were reviewed in detail with the patient today. There is disc protrusion abutting the bilateral S1 nerve roots at L5-S1 likely contributing to the low back and left leg symptoms she has described. Lumbar facet related pain may also be playing a role.   In regards to the neck pain, there is some straightening of the cervical lordosis, muscle spasm may be contributing. There is no significant degree of cervical level neuroforaminal stenosis. She has positive Tinel's on the right medial epicodyle. We discussed possible ulnar neuropathy contributing.   -She is interested in beginning a formal course of in person physical therapy for the neck and back pain.  The order was entered.  -She was interested in occupational therapy as well.  However, she prefers to begin PT first.  The order for OT was added so she may begin OT when her schedule allows  -In the interim, we discussed trying to avoid leaning on her right elbow and trying to maintain right elbow extension overnight, such as by loosely wrapping a towel around the right elbow during sleep  -To help differentiate cervical radiculopathy, ulnar neuropathy, double crush, will add an EMG of the right arm  -cont cyclobenzaprine as needed. Gabapentin could be considered. She felt pain was adequately controlled to begin PT at this time  -We discussed other options which could be considered in the future if she is having persistent pain and such as MIKAELA  - RTC after 6 weeks of PT, or sooner if needed    Ready to learn, no  apparent learning barriers.  Education provided on treatment plan according to patient's preferred learning style.  Patient verbalizes understanding.   __________________________________    94 minutes spent by me on the date of the encounter doing chart review, history and exam, documentation and further activities per the note         Again, thank you for allowing me to participate in the care of your patient.      Sincerely,    MARINO Rizzo CNP

## 2024-01-29 NOTE — PATIENT INSTRUCTIONS
It was a pleasure seeing you in the clinic today.  As discussed, we made the following updates to your plan of care:

## 2024-02-02 ENCOUNTER — THERAPY VISIT (OUTPATIENT)
Dept: PHYSICAL THERAPY | Facility: CLINIC | Age: 44
End: 2024-02-02
Attending: NURSE PRACTITIONER
Payer: COMMERCIAL

## 2024-02-02 DIAGNOSIS — M54.2 CHRONIC NECK PAIN: ICD-10-CM

## 2024-02-02 DIAGNOSIS — M54.42 CHRONIC BILATERAL LOW BACK PAIN WITH LEFT-SIDED SCIATICA: Primary | ICD-10-CM

## 2024-02-02 DIAGNOSIS — M54.16 LUMBAR RADICULOPATHY: ICD-10-CM

## 2024-02-02 DIAGNOSIS — M51.369 BULGING LUMBAR DISC: ICD-10-CM

## 2024-02-02 DIAGNOSIS — R20.2 NUMBNESS AND TINGLING: ICD-10-CM

## 2024-02-02 DIAGNOSIS — G89.29 CHRONIC BILATERAL LOW BACK PAIN WITH LEFT-SIDED SCIATICA: Primary | ICD-10-CM

## 2024-02-02 DIAGNOSIS — M54.9 UPPER BACK PAIN: ICD-10-CM

## 2024-02-02 DIAGNOSIS — R20.0 NUMBNESS AND TINGLING: ICD-10-CM

## 2024-02-02 DIAGNOSIS — G89.29 CHRONIC NECK PAIN: ICD-10-CM

## 2024-02-02 PROCEDURE — 97110 THERAPEUTIC EXERCISES: CPT | Mod: GP | Performed by: PHYSICAL THERAPIST

## 2024-02-02 PROCEDURE — 97161 PT EVAL LOW COMPLEX 20 MIN: CPT | Mod: GP | Performed by: PHYSICAL THERAPIST

## 2024-02-02 NOTE — PROGRESS NOTES
PHYSICAL THERAPY EVALUATION  Type of Visit: Evaluation    Patient is a 43 year old female presenting with hx of neck and low back pain. Has had symptoms since she was a teenager. Had an acute exacerbation of low back pain after gardening, and lifting last year. Her low back pain is bilateral, L LE proximal to the knee. Also has had virtual PT for this in the past. Denies any balance issues, saddle paraesthesia, numbness tingling into distal LE. Doesn't notice functional weakness. Worst 10/10. EASE: muscle relaxer, ice, prednisone. AGG: Worse with standing 1 hour, lifting. Usually takes a few days for symptoms to ease. Has 2 kids, both vaginal deliveries.      She says that her neck and upper back R>L feel very tight. She has intermittent tingling extending from her right elbow into the right fourth and fifth digits. Has flares about 1x/month. At worst neck pain 6/10. Of note, also has hx of R RTC strain from 3 years ago, had virtual PT. Continues to have some R shoulder pain. Has an EMG scheduled for April. Has orders for OT for these concerns as well and plans to begin treatment for this in coming weeks.      See electronic medical record for Abuse and Falls Screening details.    Subjective       Presenting condition or subjective complaint: Lower back and neck pain  Date of onset: 12/22/23    Relevant medical history: Depression; Migraines or headaches   Dates & types of surgery: 1997 appendectomy    Prior diagnostic imaging/testing results: MRI     Prior therapy history for the same diagnosis, illness or injury: Yes Oct 2023 virtual physical therapy through [facility]      Living Environment  Social support: Alone   Type of home: House; 2-story   Stairs to enter the home: Yes 2 Is there a railing: No   Ramp: No   Stairs inside the home: Yes 15 Is there a railing: Yes   Help at home: None  Equipment owned:       Employment: Yes   Hobbies/Interests: Gardening, baking, outdoor activities with dogs  and kids    Patient goals for therapy: Garden, stand for ling periods of time at concerts or baking    Pain assessment: Pain present     Objective   LUMBAR SPINE OBJECTIVE  POSTURE: Sitting Posture: Rounded shoulders, Forward head  ROM:   (Degrees) Left AROM Left PROM  Right AROM Right PROM   Hip Flexion WNL NT WNL NT   Hip Extension Pain with end range NT Pain with end range NT   Hip Internal Rotation Pain with end range NT Pain with end range NT   Hip External Rotation 50 NT 50 NT   Lumbar Side glide 50% limitation 50% limitation   Lumbar Flexion WNL, HS tightness    Lumbar Extension 25% limitation, B lumbar     HIP OBJECTIVE  PELVIC/SI SCREEN: (-)  STRENGTH:   Pain: - none + mild ++ moderate +++ severe  Strength Scale: 0-5/5 Left* Right   Hip Extension 4 4   Hip Abduction 4- 4   Hip Internal Rotation 4+ 4+   Hip External Rotation 4+ 4+   Knee Flexion 5 5   Knee Extension 5 5     SPECIAL TESTS:  (-) MELL bilaterally, (+) Booker test bilaterally   PALPATION: L sided paraspinal, L glute med, L glute max, pirformis        Assessment & Plan   CLINICAL IMPRESSIONS  Medical Diagnosis: neck, upper back, and right upper extremity pain. low back pain with left lower extremity lumbar radiculopathy    Treatment Diagnosis: Neck and low back pain   Impression/Assessment: Patient is a 43 year old female with B low back and L LE pain complaints.  The following significant findings have been identified: Pain, Decreased ROM/flexibility, Decreased strength, Decreased proprioception, Impaired muscle performance, Decreased activity tolerance, and Impaired posture. Increased soft tissue restrictions of iliopsoas and reduced proximal trunk strength through B glute complexes noted. These findings are placing increased strain to lumbar spine and interfering with their ability to perform self care tasks, household chores, household mobility, and community mobility as compared to previous level of function.     Clinical Decision Making  (Complexity):  Clinical Presentation: Stable/Uncomplicated  Clinical Presentation Rationale: based on medical and personal factors listed in PT evaluation  Clinical Decision Making (Complexity): Low complexity    PLAN OF CARE  Treatment Interventions:  Modalities: Cryotherapy, Dry Needling, Hot Pack  Interventions: Gait Training, Manual Therapy, Neuromuscular Re-education, Therapeutic Activity, Therapeutic Exercise, Self-Care/Home Management    Long Term Goals     PT Goal 1  Goal Description: Patient will demonstrate ability to stand for 1 hour with <=2/10 LBP  Rationale: to maximize safety and independence with performance of ADLs and functional tasks;to maximize safety and independence within the community;to maximize safety and independence within the home  Target Date: 04/26/24  PT Goal 2  Goal Description: Patient will demonstrate B glute med and max strength of >=4+/5  Rationale: to maximize safety and independence within the community;to maximize safety and independence within the home;to maximize safety and independence with performance of ADLs and functional tasks  Target Date: 04/26/24      Frequency of Treatment: 1x/ weekly or every other week  Duration of Treatment: 12 weeks    Education Assessment:   Learner/Method: Patient;Demonstration;Pictures/Video  Education Comments: PTRx- QR code    Risks and benefits of evaluation/treatment have been explained.   Patient/Family/caregiver agrees with Plan of Care.     Evaluation Time:        Signing Clinician: Padmini Malave, PT

## 2024-02-16 ENCOUNTER — THERAPY VISIT (OUTPATIENT)
Dept: PHYSICAL THERAPY | Facility: CLINIC | Age: 44
End: 2024-02-16
Payer: COMMERCIAL

## 2024-02-16 DIAGNOSIS — M54.2 CHRONIC NECK PAIN: ICD-10-CM

## 2024-02-16 DIAGNOSIS — G89.29 CHRONIC NECK PAIN: ICD-10-CM

## 2024-02-16 DIAGNOSIS — M54.9 UPPER BACK PAIN: Primary | ICD-10-CM

## 2024-02-16 PROCEDURE — 97140 MANUAL THERAPY 1/> REGIONS: CPT | Mod: GP | Performed by: PHYSICAL THERAPIST

## 2024-02-16 PROCEDURE — 97110 THERAPEUTIC EXERCISES: CPT | Mod: GP | Performed by: PHYSICAL THERAPIST

## 2024-02-29 ENCOUNTER — TELEPHONE (OUTPATIENT)
Dept: PHYSICAL MEDICINE AND REHAB | Facility: CLINIC | Age: 44
End: 2024-02-29
Payer: COMMERCIAL

## 2024-02-29 NOTE — TELEPHONE ENCOUNTER
Spoke with pt because of appt on 3/11 with Alysia Hartley; provider template change needed to change start time Magalie Chavez on 2/29/2024 at 3:58 PM

## 2024-03-11 ENCOUNTER — THERAPY VISIT (OUTPATIENT)
Dept: PHYSICAL THERAPY | Facility: CLINIC | Age: 44
End: 2024-03-11
Payer: COMMERCIAL

## 2024-03-11 DIAGNOSIS — G89.29 CHRONIC BILATERAL LOW BACK PAIN WITH LEFT-SIDED SCIATICA: Primary | ICD-10-CM

## 2024-03-11 DIAGNOSIS — M54.42 CHRONIC BILATERAL LOW BACK PAIN WITH LEFT-SIDED SCIATICA: Primary | ICD-10-CM

## 2024-03-11 PROCEDURE — 97110 THERAPEUTIC EXERCISES: CPT | Mod: GP | Performed by: PHYSICAL THERAPIST

## 2024-03-11 PROCEDURE — 97140 MANUAL THERAPY 1/> REGIONS: CPT | Mod: GP | Performed by: PHYSICAL THERAPIST

## 2024-03-29 ENCOUNTER — THERAPY VISIT (OUTPATIENT)
Dept: PHYSICAL THERAPY | Facility: CLINIC | Age: 44
End: 2024-03-29
Payer: COMMERCIAL

## 2024-03-29 DIAGNOSIS — M54.42 CHRONIC BILATERAL LOW BACK PAIN WITH LEFT-SIDED SCIATICA: Primary | ICD-10-CM

## 2024-03-29 DIAGNOSIS — G89.29 CHRONIC BILATERAL LOW BACK PAIN WITH LEFT-SIDED SCIATICA: Primary | ICD-10-CM

## 2024-03-29 PROCEDURE — 97110 THERAPEUTIC EXERCISES: CPT | Mod: GP | Performed by: PHYSICAL THERAPIST

## 2024-03-29 PROCEDURE — 97140 MANUAL THERAPY 1/> REGIONS: CPT | Mod: GP | Performed by: PHYSICAL THERAPIST

## 2024-03-29 NOTE — PROGRESS NOTES
"SUBJECTIVE  BODY PART: Neck pain, low back pain  Visit #:  4/12    Had to do some shoveling and it was a little sore in the lower back, R>L. Exercises are going well.      OBJECTIVE    Lumbar ROM:   Flexion: tightness  Ext: stiffness  SB R./L:to knee joint, discomfort to opposite side   Rotation R/L: no pain      THERAPEUTIC EXERCISES- direction supervision and instruction provided to carry out therapeutic exercises, with the intention to improve strength and range of motion.     Standing hip flexor stretch 2x30\" on step  Side stepping RTB ankles 3x8 steps  Bridges marching x8-10, cues to maintain neutral spine   Pallof press at cable machine #1 3x15-20 presses, B  Modified side plank 2x30\", B  Deadbug with marching, UE pressure against wall 3x30\" eac      MANUAL- intended to increase local circulation, reduce pain, as well as to reduce muscle stiffness and or improve joint mobility.     PA lumbar grade III  STM lumbar paraspinals  Longitudinal traction      ASSESSMENT  Increased tenderness through lumbar paraspinals. Mod cues still required to maintain neutral spine with stabilization activities. No increase in pain with there-ex progressions otherwise.    PLAN  Progressive core strengthening    "

## 2024-04-08 ENCOUNTER — OFFICE VISIT (OUTPATIENT)
Dept: NEUROLOGY | Facility: CLINIC | Age: 44
End: 2024-04-08
Attending: NURSE PRACTITIONER
Payer: COMMERCIAL

## 2024-04-08 DIAGNOSIS — R20.2 NUMBNESS AND TINGLING: Primary | ICD-10-CM

## 2024-04-08 DIAGNOSIS — R20.0 NUMBNESS AND TINGLING: Primary | ICD-10-CM

## 2024-04-08 PROCEDURE — 95909 NRV CNDJ TST 5-6 STUDIES: CPT | Performed by: INTERNAL MEDICINE

## 2024-04-08 PROCEDURE — 95886 MUSC TEST DONE W/N TEST COMP: CPT | Performed by: INTERNAL MEDICINE

## 2024-04-08 NOTE — PROGRESS NOTES
[facility]  Electrodiagnostic Laboratory                 Department of Neurology                                                                                                         Test Date:  2024    Patient: Sherri Milton : 1980 Physician: Erich Reeves MD   Sex: Female AGE: 43 year Ref Phys:    ID#: [id number]   Technician: Juan M Vazquez     History and Examination:  Patient is a 42 yo female who presents for evaluation of intermittent numbness/tingling in the right 4th/5th fingers. When symptoms are present they often will last for days. EMG ordered to evaluate for focal neuropathy versus radiculopathy.     Techniques:  Motor conduction studies were done with surface recording electrodes. Sensory conduction studies were performed with surface electrodes, unless indicated otherwise by (n), designating the use of subdermal recording electrodes. Temperature was monitored and recorded throughout the study. Upper extremities were maintained at a temperature of 32 degrees Centigrade or higher.  EMG was done with a concentric needle electrode.     Results:  Evaluation of the left ulnar sensory nerve showed mildly decreased conduction velocity (46 m/s), which is likely related to temperature effect (asymptomatic side).  All remaining nerves (as indicated in the following tables) were within normal limits.      All F Wave latencies were within normal limits.      All examined muscles (as indicated in the following table) showed no evidence of electrical instability.        Interpretation:  This is a normal study. In particular, there is no electrophysiologic evidence of right sided ulnar mononeuropathy or cervical radiculopathy. See comment.    Comment: If there remains clinical concern for right ulnar mononeuropathy, right ulnar nerve ultrasound could be considered.   ___________________________  Erich Reeves MD        Nerve Conduction Studies  Motor Sites       Latency Amplitude Neg. Amp Diff Segment Distance Velocity Neg. Dur Neg Area Diff Temperature Comment   Site (ms) Norm (mV) Norm (%)  cm m/s Norm (ms) (%) ( C)    Right Median (APB) Motor   Wrist 3.7  < 4.4 10.5  > 5.0  Wrist-APB 8   6.0  32.5    Elbow 7.7 - 10.6  > 5.0 1 Elbow-Wrist 21.5 54  > 48 6.2 -1 32.5    Right Ulnar (ADM) Motor   Wrist 2.8  < 3.5 10.0  > 5.0  Wrist-ADM 8   4.9  32.2    Bel Elbow 6.6 - 10.0 - 0 Bel Elbow-Wrist 21.8 57  > 48 4.5 -12 32.1    Abv Elbow 7.7 - 8.2 - -18 Abv Elbow-Bel Elbow 7.5 68  > 48 5.0 -14 32    Right Ulnar (FDI) Motor   Wrist 3.0 - 10.6 -      5.5  32    Bel Elbow 7.2 - 10.5 - -1 Bel Elbow-Wrist 21.5 51  > 48 5.2 -5 32    Abv Elbow 8.3 - 8.9 - -15 Abv Elbow-Bel Elbow 7.5 68  > 48 5.3 -9 32      F-Wave Sites      Min F-Lat Max-Min F-Lat Mean F-Lat   Site (ms) (ms) (ms)   Right Ulnar F-Wave   Wrist 25.9 3.1 27.7     Sensory Sites      Onset Lat Peak Lat Amp (O-P) Amp (P-P) Segment Distance Velocity Temperature Comment   Site ms (ms)  V Norm ( V)  cm m/s Norm ( C)    Right Median Sensory   Wrist-Dig II 2.6 3.4 56  > 10 95 Wrist-Dig II 14 54  > 48 31.7    Right Median-Ulnar Palmar Sensory        Median   Palm-Wrist 1.45 1.95 96 - 113 Palm-Wrist 8 55 - 32.5         Ulnar   Palm-Wrist 1.23 1.73 26 - 30 Palm-Wrist 8 65 - 32.3    Left Ulnar Sensory   Wrist-Dig V 2.7 3.5 67  > 8 99 Wrist-Dig V 12.5 46  > 48 27.5    Right Ulnar Sensory   Wrist-Dig V 2.3 3.1 45  > 8 65 Wrist-Dig V 12.5 54  > 48 31.7      Inter-Nerve Comparisons     Nerve 1 Value 1 Nerve 2 Value 2 Parameter Result Normal   Sensory Sites   R Median Palm-Wrist 1.95 ms R Ulnar Palm-Wrist 1.73 ms Peak Lat Diff 0.22 ms <0.40       Electromyography     Side Muscle Ins Act Fibs/PSW Fasc HF Amp Dur Poly Recrt Int Pat   Right Deltoid Ant Nml None Nml 0 Nml Nml 0 Nml Nml   Right Triceps Nml None Nml 0 Nml Nml 0 Nml Nml   Right EDC Nml None Nml 0 Nml Nml 0 Nml Nml   Right FDI Nml None Nml 0 Nml Nml 0 Nml Nml   Right EIP Nml None Nml  0 Nml Nml 0 Nml Nml         NCS Waveforms:    Motor           Sensory                F-Wave

## 2024-04-08 NOTE — LETTER
2024       RE: Sherri Milton  [address] [facility] [address]     Dear Colleague,    Thank you for referring your patient, Sherri Milton, to the [facility] EMG [facility] [place] at [facility]. Please see a copy of my visit note below.                            [facility]  Electrodiagnostic Laboratory                 Department of Neurology                                                                                                         Test Date:  2024    Patient: Sherri Milton : 1980 Physician: Erich Reeves MD   Sex: Female AGE: 43 year Ref Phys:    ID#: [id number]   Technician: Juan M Vazquez     History and Examination:  Patient is a 44 yo female who presents for evaluation of intermittent numbness/tingling in the right 4th/5th fingers. When symptoms are present they often will last for days. EMG ordered to evaluate for focal neuropathy versus radiculopathy.     Techniques:  Motor conduction studies were done with surface recording electrodes. Sensory conduction studies were performed with surface electrodes, unless indicated otherwise by (n), designating the use of subdermal recording electrodes. Temperature was monitored and recorded throughout the study. Upper extremities were maintained at a temperature of 32 degrees Centigrade or higher.  EMG was done with a concentric needle electrode.     Results:  Evaluation of the left ulnar sensory nerve showed mildly decreased conduction velocity (46 m/s), which is likely related to temperature effect (asymptomatic side).  All remaining nerves (as indicated in the following tables) were within normal limits.      All F Wave latencies were within normal limits.      All examined muscles (as indicated in the following table) showed no evidence of electrical instability.        Interpretation:  This is a normal study. In particular, there is no electrophysiologic evidence of  right sided ulnar mononeuropathy or cervical radiculopathy. See comment.    Comment: If there remains clinical concern for right ulnar mononeuropathy, right ulnar nerve ultrasound could be considered.   ___________________________  Erich Reeves MD        Nerve Conduction Studies  Motor Sites      Latency Amplitude Neg. Amp Diff Segment Distance Velocity Neg. Dur Neg Area Diff Temperature Comment   Site (ms) Norm (mV) Norm (%)  cm m/s Norm (ms) (%) ( C)    Right Median (APB) Motor   Wrist 3.7  < 4.4 10.5  > 5.0  Wrist-APB 8   6.0  32.5    Elbow 7.7 - 10.6  > 5.0 1 Elbow-Wrist 21.5 54  > 48 6.2 -1 32.5    Right Ulnar (ADM) Motor   Wrist 2.8  < 3.5 10.0  > 5.0  Wrist-ADM 8   4.9  32.2    Bel Elbow 6.6 - 10.0 - 0 Bel Elbow-Wrist 21.8 57  > 48 4.5 -12 32.1    Abv Elbow 7.7 - 8.2 - -18 Abv Elbow-Bel Elbow 7.5 68  > 48 5.0 -14 32    Right Ulnar (FDI) Motor   Wrist 3.0 - 10.6 -      5.5  32    Bel Elbow 7.2 - 10.5 - -1 Bel Elbow-Wrist 21.5 51  > 48 5.2 -5 32    Abv Elbow 8.3 - 8.9 - -15 Abv Elbow-Bel Elbow 7.5 68  > 48 5.3 -9 32      F-Wave Sites      Min F-Lat Max-Min F-Lat Mean F-Lat   Site (ms) (ms) (ms)   Right Ulnar F-Wave   Wrist 25.9 3.1 27.7     Sensory Sites      Onset Lat Peak Lat Amp (O-P) Amp (P-P) Segment Distance Velocity Temperature Comment   Site ms (ms)  V Norm ( V)  cm m/s Norm ( C)    Right Median Sensory   Wrist-Dig II 2.6 3.4 56  > 10 95 Wrist-Dig II 14 54  > 48 31.7    Right Median-Ulnar Palmar Sensory        Median   Palm-Wrist 1.45 1.95 96 - 113 Palm-Wrist 8 55 - 32.5         Ulnar   Palm-Wrist 1.23 1.73 26 - 30 Palm-Wrist 8 65 - 32.3    Left Ulnar Sensory   Wrist-Dig V 2.7 3.5 67  > 8 99 Wrist-Dig V 12.5 46  > 48 27.5    Right Ulnar Sensory   Wrist-Dig V 2.3 3.1 45  > 8 65 Wrist-Dig V 12.5 54  > 48 31.7      Inter-Nerve Comparisons     Nerve 1 Value 1 Nerve 2 Value 2 Parameter Result Normal   Sensory Sites   R Median Palm-Wrist 1.95 ms R Ulnar Palm-Wrist 1.73 ms Peak Lat Diff 0.22 ms <0.40        Electromyography     Side Muscle Ins Act Fibs/PSW Fasc HF Amp Dur Poly Recrt Int Pat   Right Deltoid Ant Nml None Nml 0 Nml Nml 0 Nml Nml   Right Triceps Nml None Nml 0 Nml Nml 0 Nml Nml   Right EDC Nml None Nml 0 Nml Nml 0 Nml Nml   Right FDI Nml None Nml 0 Nml Nml 0 Nml Nml   Right EIP Nml None Nml 0 Nml Nml 0 Nml Nml         NCS Waveforms:    Motor           Sensory                F-Wave                     Again, thank you for allowing me to participate in the care of your patient.      Sincerely,    Erich Reeves MD

## 2024-04-18 ENCOUNTER — OFFICE VISIT (OUTPATIENT)
Dept: PHYSICAL MEDICINE AND REHAB | Facility: CLINIC | Age: 44
End: 2024-04-18
Payer: COMMERCIAL

## 2024-04-18 VITALS
SYSTOLIC BLOOD PRESSURE: 111 MMHG | HEIGHT: 68 IN | DIASTOLIC BLOOD PRESSURE: 67 MMHG | HEART RATE: 86 BPM | BODY MASS INDEX: 28.2 KG/M2 | WEIGHT: 186.1 LBS | OXYGEN SATURATION: 100 %

## 2024-04-18 DIAGNOSIS — R20.0 NUMBNESS AND TINGLING: ICD-10-CM

## 2024-04-18 DIAGNOSIS — M62.838 MUSCLE SPASM: ICD-10-CM

## 2024-04-18 DIAGNOSIS — G89.29 CHRONIC BILATERAL LOW BACK PAIN WITH LEFT-SIDED SCIATICA: Primary | ICD-10-CM

## 2024-04-18 DIAGNOSIS — M51.369 BULGING LUMBAR DISC: ICD-10-CM

## 2024-04-18 DIAGNOSIS — M54.2 CHRONIC NECK PAIN: ICD-10-CM

## 2024-04-18 DIAGNOSIS — R20.2 NUMBNESS AND TINGLING: ICD-10-CM

## 2024-04-18 DIAGNOSIS — M54.42 CHRONIC BILATERAL LOW BACK PAIN WITH LEFT-SIDED SCIATICA: Primary | ICD-10-CM

## 2024-04-18 DIAGNOSIS — G89.29 CHRONIC NECK PAIN: ICD-10-CM

## 2024-04-18 PROCEDURE — 99215 OFFICE O/P EST HI 40 MIN: CPT | Performed by: NURSE PRACTITIONER

## 2024-04-18 RX ORDER — CYCLOBENZAPRINE HCL 5 MG
5-10 TABLET ORAL 3 TIMES DAILY PRN
Qty: 30 TABLET | Refills: 0 | Status: SHIPPED | OUTPATIENT
Start: 2024-04-18

## 2024-04-18 ASSESSMENT — PAIN SCALES - GENERAL: PAINLEVEL: MILD PAIN (2)

## 2024-04-18 NOTE — NURSING NOTE
Chief Complaint   Patient presents with    RECHECK     6 month follow up      Vitals were taken and medications were reconciled.   Giovany Díaz, LALA  10:20 AM

## 2024-04-18 NOTE — PROGRESS NOTES
"PM&R Follow-Up Visit -     Date of Initial Visit: 1/29/24  LOV: 1/29/24  TD: 4/18/2024     Recall: Sherri Milton is a 43 year old female who presents with a chief complaint of neck and low back pain.     INTERVAL HISTORY:  Patient was last seen in clinic 1/29/24. At that visit, the plan was to refer for PT and OT, obtain an EMG of the right upper extremity, and continue cyclobenzaprine as needed.    She was seen today in the clinic for follow-up.  At the last visit she described:  -Neck and upper back pain R>L  -Intermittent tingling from the right elbow into the right 4th and 5th digits  -Low back pain, which varies from side-to-side.  Today she reports the right side is bothering her more regularly now.  She says the low back pain is radiating towards the sides, but not down her leg today.    The back pain is more bothersome for her than the neck and upper back pain.    Since last time, she started physical therapy.  She feels that the PT/home exercise program has been helpful.  She says that she has been feeling more \"resilient\" as result-for example, she was on her feet all day on [facility] and still felt that her back pain was manageable after the activity.  She says that gardening tends to be aggravating for her low back pain, and is wondering how her back will do this spring/summer.    There was an EMG done 4/8/2024 of the right upper extremity which was normal.    She is tolerating cyclobenzaprine on an as-needed basis and asked for a refill of the medication.     She reports some right-sided leg weakness.  She denies weakness with ambulating or traversing stairs.      RECALL HISTORY OF PRESENT ILLNESS:  Sherri Milton is a 43 year old female who presents with a chief complaint of neck and low back pain.     She was referred to the spine clinic after a family medicine clinic visit 1/10/2024.    She was seen today in the clinic.  She describes both chronic neck and low back pain.  The low back pain is the most " bothersome for her lately.    Neck pain  She reports neck, upper back, and right upper extremity symptoms since she was a teenager.  She says that her neck and upper back R>L feel very tight.   She has intermittent tingling extending from her right elbow into the right fourth and fifth digits.  She experiences flares of the neck and upper extremity pain around once a month.  She says that many years ago she had an EMG of both arms which was unrevealing.  She has tried multiple treatments for the pain over the years, including regular massages and chiropractic treatments.  She notes that with chiropractic adjustments she sometimes has a shocklike pain radiating from her neck down her right arm.  Aggravating factors for the neck pain include: gardening, more activity  Relieving factors include: massage, chiropractor   Today she rates the neck discomfort 3/10.  At its most severe, the pain reaches 6/10.  She uses ibuprofen as needed for more severe pain.  Of note, she also reports a history of right rotator cuff strain 3 years ago.  At the time she did virtual visit physical therapy.  She endorses some degree of ongoing right shoulder pain.    Low back pain  She reports low back pain that started last June after landscaping and pulling up sod.  She experienced severe pain the next day and went to urgent care and tried a muscle relaxer (cyclobenzaprine).  Since that time, she had 2 more flares of pain (once in July after standing at a festival, and another episode after prolonged standing baking cookies in December).  She says that it took 2 to 3 days to recover after the flares of pain.  She had some degree of persistent low back pain between the flares of pain as well.   She describes low back pain that alternates from side-to-side.  Lately the left side of the low back has been bothering her more.  She has intermittent pain radiating from the left side of her back to the posterior left thigh (terminating around the  mid thigh).  Aggravating factors for the low back pain include: Lifting or standing  Relieving factors include: ice, ibuprofen, MSK relaxer, rest, prednisone (for flare of pain in December)  She has tried virtual visit physical therapy for the low back pain.    She denies balance problems, difficulty with fine motor tasks such as manipulating buttons or zippers, falls, weakness, bowel or bladder incontinence, saddle anesthesia, unintentional weight loss, fevers/chills, or night sweats.       PRIOR INJURIES/TREATMENT:   Ice/Heat: ice helps her low back. Uses heat for upper back    Brace: none    Physical Therapy:   Current PT  PT for right shoulder 3 years       - Current Pain Medications -   Cyclobenzaprine - PRN during flares of pain  Ibuprofen  Lidocaine patches or roll on  rizatriptan    - Prior/Trialed Pain Medications -   none    Prior Procedures:  Date    Procedure   Improvement (%)  none              Prior Related Surgery: none     Other (acupuncture, OMT, CMM, TENS, DME, etc.):   Monthly massages  Chiropractic - most recently in early 2023    Specialists Seen - (with most recent, available notes and clinic visits reviewed)   1. none     IMAGING - reviewed   MR CERVICAL SPINE W/O CONTRAST  1/22/2024   FINDINGS:     Minimal anterolisthesis at C5-6. Normal marrow signal. No cord signal  abnormality. Diffusion-weighted images are negative for focal  abnormality.     The findings on a level by level basis are as follows:     C2-3: No spinal canal or neural foraminal stenosis.     C3-4: No spinal canal or neural foraminal stenosis.     C4-5: No spinal canal or neural foraminal stenosis.     C5-6: Disc osteophyte complex and mild bilateral facet arthrosis. No  spinal canal or neural foraminal stenosis.     C6-7: Left subarticular protrusion. No spinal canal or neural  foraminal stenosis.     C7-T1: No spinal canal or neural foraminal stenosis.     The visualized skull base, posterior fossa, and paraspinal  soft  tissues are within normal limits.                                                              IMPRESSION:  Mild spondylosis without high-grade spinal canal or neural foraminal  stenosis. Left subarticular protrusion at C6-7.       MR LUMBAR SPINE W/O CONTRAST 1/22/2024   Findings:   There are 5 lumbar-type vertebrae.  The tip of the conus medullaris is  at L1.  Normal lumbar vertebral alignment.  There is mild disc height  narrowing and disc dessication at L5-S1.  Normal marrow signal.     On a level by level basis:     T12-L1: No spinal canal or neuroforaminal stenosis.     L1-2: No spinal canal or neuroforaminal stenosis.     L2-3: No spinal canal or neuroforaminal stenosis.     L3-4: Bilateral facet hypertrophy. No spinal canal or neuroforaminal  stenosis.     L4-5: Bilateral facet hypertrophy. No spinal canal or neuroforaminal  stenosis.     L5-S1: Central protrusion effacing the lateral recesses and abutting  the descending bilateral S1 nerve roots. No spinal canal or  neuroforaminal stenosis.     Paraspinous tissues are within normal limits.                              Impression: No significant spinal canal or neural foraminal stenosis.  Central protrusion at L5-S1 effaces the lateral recesses and abuts the  descending bilateral S1 nerve roots.         Review Of Systems:  I am responding to those symptoms which are directly relevant to the specific indication for my consultation. I recommend that the patient follow up with their primary or referring provider to pursue any other symptoms which may be of concern.       Medical History:  She  has a past medical history of Arthritis (June 2023), Depression, Depressive disorder (1995), Factor II deficiency (H), Herpes simplex, and Uncomplicated asthma (1995).     She has a past surgical history that includes appendectomy.    Family History  Her family history includes Allergies in her son; Asthma in her mother and son; Breast Cancer in some other family  "members; Cerebrovascular Disease in her maternal grandfather; Diabetes in her maternal grandmother and paternal grandfather; Heart Disease in her maternal grandfather and paternal grandfather; Heart Failure in her maternal grandmother and paternal grandmother; Hyperlipidemia in her father and mother; Hypertension in her father and mother; Lymphoma in her father; Other - See Comments in her mother and sister; Other Cancer in her father.     Social History:  Work: , sits/stands/uses treadmill   Current living situation: Lives with her kids. Performs ADLs/IADLs independently.      She  reports that she quit smoking about 23 years ago. Her smoking use included cigarettes. She started smoking about 26 years ago. She has a 0.3 pack-year smoking history. She has never been exposed to tobacco smoke. She has never used smokeless tobacco. She reports current alcohol use. She reports current drug use. Drug: Marijuana.        Current Medications:   She has a current medication list which includes the following prescription(s): cetirizine, cyclobenzaprine, levonorgestrel, rizatriptan, and valacyclovir.     Allergies:    -- Nuts -- Hives and Itching   -- Animal Dander -- Hives   -- Grass Extracts [Gramineae Pollens] -- Cough, Itching and Other (See                            Comments)    PHYSICAL EXAMINATION:  /67 (BP Location: Right arm, Patient Position: Sitting, Cuff Size: Adult Regular)   Pulse 86   Ht 1.73 m (5' 8.11\")   Wt 84.4 kg (186 lb 1.6 oz)   SpO2 100%   BMI 28.21 kg/m     --CONSTITUTIONAL: Vital signs as above. No acute distress. The patient is well nourished and well groomed.  --PSYCHIATRIC: The patient is awake, alert, oriented to person, place, time and answering questions appropriately with clear speech. Appropriate mood and affect   --HEENT: Sclera are non-injected. Extraocular muscles are intact. Moist oral mucosa.  --SKIN: Skin over the face, bilateral upper extremities, bilateral " lower extremities, and posterior torso is clean, dry, intact without rashes.   --RESPIRATORY: Normal rhythm and effort. No abnormal accessory muscle breathing patterns noted.   --GROSS MOTOR: Easily arises from a seated position. Toe walking and heel walking are normal.    --CERVICAL SPINE: Inspection reveals no evidence of deformity. Range of motion is not limited in cervical flexion, extension, lateral rotation. No tenderness to palpation cervical spine.  Spurling maneuver negative bilaterally.  --STANDING EXAMINATION: Gait is non-antalgic. Normal lumbar lordosis noted, no lateral shift.   --UPPER EXTREMITY MOTOR TESTING:  Wrist flexion left 5/5, right 5/5  Wrist extension left 5/5, right 5/5  Biceps left 5/5, right 5/5   Triceps left 5/5, right 5/5   Shoulder abduction left 5/5, right 5/5   left 5/5, right 5/5  Finger abduction left 5/5, right 5/5  -Tinels NEGATIVE at right ulnar and radial sides of the wrist and right medial epicondyle  --LOWER EXTREMITY MOTOR TESTING:  Plantar flexion left 5/5, right 5/5   Dorsiflexion left 5/5, right 5/5   Great toe MTP extension left 5/5, right 5/5  Knee flexion left 5/5, right 5/5  Knee extension left 5/5, right 5/5   Hip flexion left 5/5, right 5/5  --MUSCULOSKELETAL: Lumbar spine inspection reveals no evidence of deformity. Range of motion is not limited in lumbar flexion, extension, lateral rotation. No tenderness to palpation lumbar spine. Straight leg raise is negative bilat. Sciatic notch non-tender. Facet loading maneuvers are positive bilat.  --SACROILIAC JOINT: +pain with palpation of the bilateral SI joint.  Zain's negative.  Thigh thrust test neg bilat.  Distraction negative.   Caitlyn's negative bilaterally. Gaenslen's negative bilaterally.  Sacroiliac Joint Compression Test negative bilaterally. Sacral Thrust/Yeoman's Test ?positive bilaterally, but elicits pain in the upper lumbar spine.  --HIPS: Full range of motion bilaterally.  FABERs negative on both  sides.  No pain with logrolling. No pain with palpation of the greater trochanters.   --NEUROLOGIC: CN III-XII are grossly intact.   2/4 symmetric biceps & brachioradialis reflexes bilaterally. Sensation to upper extremities is intact.  Negative Garza's bilaterally.    2/4 patellar and achilles reflexes bilaterally.  Sensation to light touch is intact in the bilateral L4, L5, and S1 dermatomes. No clonus.    --VASCULAR:  Warm upper and lower limbs bilaterally. There is no pitting edema of the bilateral lower extremities.       ASSESSMENT:  Sherri Milton is a pleasant 43 year old female who presents with     # Neck and upper back pain R>L  # muscle spasm  # Tingling right elbow/forearm radiating to the right 4th and 5th digits  # positive tinels right medial epicondyle (positive on initial exam, negative today)  # chronic intermittent right shoulder pain    # Bilateral low back pain  # LLE pain in S1 type pattern (no LE radiating pain reported today)      MRI of the cervical spine 1/22/24 shows:  -Minimal anterolisthesis at C5-6.   -Mild spondylosis, no high-grade SCS or NFS  -C6-7 left subarticular protrusion but no neuroforaminal stenosis or spinal canal stenosis    MRI of the lumbar spine 1/22/24 shows:  -Lower lumbar facet hypertrophy  -Disc protrusion at L5-S1 with lateral recess narrowing, abutting the descending bilateral S1 nerve roots    EMG 4/8/2024  Interpretation:  This is a normal study. In particular, there is no electrophysiologic evidence of right sided ulnar mononeuropathy or cervical radiculopathy. See comment.  Comment: If there remains clinical concern for right ulnar mononeuropathy, right ulnar nerve ultrasound could be considered.     Differentials for neck/RUE pain includes ulnar neuropathy, muscle spasm/myofascial pain, cervical radiculopathy    Differential for low back pain includes: S1 lumbar radiculopathy, facet mediated pain, left intra-articular hip related pain, SI joint related  pain        PLAN:  -EMG of the right upper extremity was reviewed in detail with the patient today.  The test was normal.  We discussed the option of obtaining right ulnar nerve ultrasound as the EMG and MRI of the cervical spine have thus far not revealed the source of her right elbow/right 4th and 5th digit paresthesias.  Her preference for now is to start occupational therapy.  I provided her with the phone number for scheduling.  We also discussed maintaining the right arm in extension overnight such as by loosely wrapping a towel around her right arm  -Could consider x-ray imaging of the SI joints.  Given only 2 SI joint tests positive suspicion is higher for lumbar radicular pain and/or lumbar facet mediated pain.  Additionally, patient prefers to hold off on SI joint x-rays at this time  -Cyclobenzaprine was refilled  -Continue PT with a home exercise program  -We discussed other options which could be considered such as gabapentin and/or MIKAELA.  She feels that the pain is adequately controlled without further intervention at this time  -RTC in 8 weeks          Ready to learn, no apparent learning barriers.  Education provided on treatment plan according to patient's preferred learning style.  Patient verbalizes understanding.   __________________________________  Alysia Hartley NP  Physical Medicine & Rehabilitation        42 minutes spent by me on the date of the encounter doing chart review, history and exam, documentation and further activities per the note

## 2024-04-18 NOTE — LETTER
"4/18/2024       RE: Sherri Milton  [id number] [facility] [address]     Dear Colleague,    Thank you for referring your patient, Sherri Milton, to the [facility] [place] at [facility]. Please see a copy of my visit note below.    PM&R Follow-Up Visit -     Date of Initial Visit: 1/29/24  LOV: 1/29/24  TD: 4/18/2024     Recall: Sherri Milton is a 43 year old female who presents with a chief complaint of neck and low back pain.     INTERVAL HISTORY:  Patient was last seen in clinic 1/29/24. At that visit, the plan was to refer for PT and OT, obtain an EMG of the right upper extremity, and continue cyclobenzaprine as needed.    She was seen today in the clinic for follow-up.  At the last visit she described:  -Neck and upper back pain R>L  -Intermittent tingling from the right elbow into the right 4th and 5th digits  -Low back pain, which varies from side-to-side.  Today she reports the right side is bothering her more regularly now.  She says the low back pain is radiating towards the sides, but not down her leg today.    The back pain is more bothersome for her than the neck and upper back pain.    Since last time, she started physical therapy.  She feels that the PT/home exercise program has been helpful.  She says that she has been feeling more \"resilient\" as result-for example, she was on her feet all day on Easter and still felt that her back pain was manageable after the activity.  She says that gardening tends to be aggravating for her low back pain, and is wondering how her back will do this spring/summer.    There was an EMG done 4/8/2024 of the right upper extremity which was normal.    She is tolerating cyclobenzaprine on an as-needed basis and asked for a refill of the medication.     She reports some right-sided leg weakness.  She denies weakness with ambulating or traversing stairs.      RECALL " HISTORY OF PRESENT ILLNESS:  Sherri Milton is a 43 year old female who presents with a chief complaint of neck and low back pain.     She was referred to the spine clinic after a family medicine clinic visit 1/10/2024.    She was seen today in the clinic.  She describes both chronic neck and low back pain.  The low back pain is the most bothersome for her lately.    Neck pain  She reports neck, upper back, and right upper extremity symptoms since she was a teenager.  She says that her neck and upper back R>L feel very tight.   She has intermittent tingling extending from her right elbow into the right fourth and fifth digits.  She experiences flares of the neck and upper extremity pain around once a month.  She says that many years ago she had an EMG of both arms which was unrevealing.  She has tried multiple treatments for the pain over the years, including regular massages and chiropractic treatments.  She notes that with chiropractic adjustments she sometimes has a shocklike pain radiating from her neck down her right arm.  Aggravating factors for the neck pain include: gardening, more activity  Relieving factors include: massage, chiropractor   Today she rates the neck discomfort 3/10.  At its most severe, the pain reaches 6/10.  She uses ibuprofen as needed for more severe pain.  Of note, she also reports a history of right rotator cuff strain 3 years ago.  At the time she did virtual visit physical therapy.  She endorses some degree of ongoing right shoulder pain.    Low back pain  She reports low back pain that started last June after landscaping and pulling up sod.  She experienced severe pain the next day and went to urgent care and tried a muscle relaxer (cyclobenzaprine).  Since that time, she had 2 more flares of pain (once in July after standing at a festival, and another episode after prolonged standing baking cookies in December).  She says that it took 2 to 3 days to recover after the flares of  pain.  She had some degree of persistent low back pain between the flares of pain as well.   She describes low back pain that alternates from side-to-side.  Lately the left side of the low back has been bothering her more.  She has intermittent pain radiating from the left side of her back to the posterior left thigh (terminating around the mid thigh).  Aggravating factors for the low back pain include: Lifting or standing  Relieving factors include: ice, ibuprofen, MSK relaxer, rest, prednisone (for flare of pain in December)  She has tried virtual visit physical therapy for the low back pain.    She denies balance problems, difficulty with fine motor tasks such as manipulating buttons or zippers, falls, weakness, bowel or bladder incontinence, saddle anesthesia, unintentional weight loss, fevers/chills, or night sweats.       PRIOR INJURIES/TREATMENT:   Ice/Heat: ice helps her low back. Uses heat for upper back    Brace: none    Physical Therapy:   Current PT  PT for right shoulder 3 years       - Current Pain Medications -   Cyclobenzaprine - PRN during flares of pain  Ibuprofen  Lidocaine patches or roll on  rizatriptan    - Prior/Trialed Pain Medications -   none    Prior Procedures:  Date    Procedure   Improvement (%)  none              Prior Related Surgery: none     Other (acupuncture, OMT, CMM, TENS, DME, etc.):   Monthly massages  Chiropractic - most recently in early 2023    Specialists Seen - (with most recent, available notes and clinic visits reviewed)   1. none     IMAGING - reviewed   MR CERVICAL SPINE W/O CONTRAST  1/22/2024   FINDINGS:     Minimal anterolisthesis at C5-6. Normal marrow signal. No cord signal  abnormality. Diffusion-weighted images are negative for focal  abnormality.     The findings on a level by level basis are as follows:     C2-3: No spinal canal or neural foraminal stenosis.     C3-4: No spinal canal or neural foraminal stenosis.     C4-5: No spinal canal or neural foraminal  stenosis.     C5-6: Disc osteophyte complex and mild bilateral facet arthrosis. No  spinal canal or neural foraminal stenosis.     C6-7: Left subarticular protrusion. No spinal canal or neural  foraminal stenosis.     C7-T1: No spinal canal or neural foraminal stenosis.     The visualized skull base, posterior fossa, and paraspinal soft  tissues are within normal limits.                                                              IMPRESSION:  Mild spondylosis without high-grade spinal canal or neural foraminal  stenosis. Left subarticular protrusion at C6-7.       MR LUMBAR SPINE W/O CONTRAST 1/22/2024   Findings:   There are 5 lumbar-type vertebrae.  The tip of the conus medullaris is  at L1.  Normal lumbar vertebral alignment.  There is mild disc height  narrowing and disc dessication at L5-S1.  Normal marrow signal.     On a level by level basis:     T12-L1: No spinal canal or neuroforaminal stenosis.     L1-2: No spinal canal or neuroforaminal stenosis.     L2-3: No spinal canal or neuroforaminal stenosis.     L3-4: Bilateral facet hypertrophy. No spinal canal or neuroforaminal  stenosis.     L4-5: Bilateral facet hypertrophy. No spinal canal or neuroforaminal  stenosis.     L5-S1: Central protrusion effacing the lateral recesses and abutting  the descending bilateral S1 nerve roots. No spinal canal or  neuroforaminal stenosis.     Paraspinous tissues are within normal limits.                              Impression: No significant spinal canal or neural foraminal stenosis.  Central protrusion at L5-S1 effaces the lateral recesses and abuts the  descending bilateral S1 nerve roots.         Review Of Systems:  I am responding to those symptoms which are directly relevant to the specific indication for my consultation. I recommend that the patient follow up with their primary or referring provider to pursue any other symptoms which may be of concern.       Medical History:  She  has a past medical history of  "Arthritis (June 2023), Depression, Depressive disorder (1995), Factor II deficiency (H), Herpes simplex, and Uncomplicated asthma (1995).     She has a past surgical history that includes appendectomy.    Family History  Her family history includes Allergies in her son; Asthma in her mother and son; Breast Cancer in some other family members; Cerebrovascular Disease in her maternal grandfather; Diabetes in her maternal grandmother and paternal grandfather; Heart Disease in her maternal grandfather and paternal grandfather; Heart Failure in her maternal grandmother and paternal grandmother; Hyperlipidemia in her father and mother; Hypertension in her father and mother; Lymphoma in her father; Other - See Comments in her mother and sister; Other Cancer in her father.     Social History:  Work: , sits/stands/uses treadmill   Current living situation: Lives with her kids. Performs ADLs/IADLs independently.      She  reports that she quit smoking about 23 years ago. Her smoking use included cigarettes. She started smoking about 26 years ago. She has a 0.3 pack-year smoking history. She has never been exposed to tobacco smoke. She has never used smokeless tobacco. She reports current alcohol use. She reports current drug use. Drug: Marijuana.        Current Medications:   She has a current medication list which includes the following prescription(s): cetirizine, cyclobenzaprine, levonorgestrel, rizatriptan, and valacyclovir.     Allergies:    -- Nuts -- Hives and Itching   -- Animal Dander -- Hives   -- Grass Extracts [Gramineae Pollens] -- Cough, Itching and Other (See                            Comments)    PHYSICAL EXAMINATION:  /67 (BP Location: Right arm, Patient Position: Sitting, Cuff Size: Adult Regular)   Pulse 86   Ht 1.73 m (5' 8.11\")   Wt 84.4 kg (186 lb 1.6 oz)   SpO2 100%   BMI 28.21 kg/m     --CONSTITUTIONAL: Vital signs as above. No acute distress. The patient is well nourished " and well groomed.  --PSYCHIATRIC: The patient is awake, alert, oriented to person, place, time and answering questions appropriately with clear speech. Appropriate mood and affect   --HEENT: Sclera are non-injected. Extraocular muscles are intact. Moist oral mucosa.  --SKIN: Skin over the face, bilateral upper extremities, bilateral lower extremities, and posterior torso is clean, dry, intact without rashes.   --RESPIRATORY: Normal rhythm and effort. No abnormal accessory muscle breathing patterns noted.   --GROSS MOTOR: Easily arises from a seated position. Toe walking and heel walking are normal.    --CERVICAL SPINE: Inspection reveals no evidence of deformity. Range of motion is not limited in cervical flexion, extension, lateral rotation. No tenderness to palpation cervical spine.  Spurling maneuver negative bilaterally.  --STANDING EXAMINATION: Gait is non-antalgic. Normal lumbar lordosis noted, no lateral shift.   --UPPER EXTREMITY MOTOR TESTING:  Wrist flexion left 5/5, right 5/5  Wrist extension left 5/5, right 5/5  Biceps left 5/5, right 5/5   Triceps left 5/5, right 5/5   Shoulder abduction left 5/5, right 5/5   left 5/5, right 5/5  Finger abduction left 5/5, right 5/5  -Tinels NEGATIVE at right ulnar and radial sides of the wrist and right medial epicondyle  --LOWER EXTREMITY MOTOR TESTING:  Plantar flexion left 5/5, right 5/5   Dorsiflexion left 5/5, right 5/5   Great toe MTP extension left 5/5, right 5/5  Knee flexion left 5/5, right 5/5  Knee extension left 5/5, right 5/5   Hip flexion left 5/5, right 5/5  --MUSCULOSKELETAL: Lumbar spine inspection reveals no evidence of deformity. Range of motion is not limited in lumbar flexion, extension, lateral rotation. No tenderness to palpation lumbar spine. Straight leg raise is negative bilat. Sciatic notch non-tender. Facet loading maneuvers are positive bilat.  --SACROILIAC JOINT: +pain with palpation of the bilateral SI joint.  Zain's negative.   Thigh thrust test neg bilat.  Distraction negative.   Caitlyn's negative bilaterally. Gaenslen's negative bilaterally.  Sacroiliac Joint Compression Test negative bilaterally. Sacral Thrust/Yeoman's Test ?positive bilaterally, but elicits pain in the upper lumbar spine.  --HIPS: Full range of motion bilaterally.  FABERs negative on both sides.  No pain with logrolling. No pain with palpation of the greater trochanters.   --NEUROLOGIC: CN III-XII are grossly intact.   2/4 symmetric biceps & brachioradialis reflexes bilaterally. Sensation to upper extremities is intact.  Negative Garza's bilaterally.    2/4 patellar and achilles reflexes bilaterally.  Sensation to light touch is intact in the bilateral L4, L5, and S1 dermatomes. No clonus.    --VASCULAR:  Warm upper and lower limbs bilaterally. There is no pitting edema of the bilateral lower extremities.       ASSESSMENT:  Sherri Milton is a pleasant 43 year old female who presents with     # Neck and upper back pain R>L  # muscle spasm  # Tingling right elbow/forearm radiating to the right 4th and 5th digits  # positive tinels right medial epicondyle (positive on initial exam, negative today)  # chronic intermittent right shoulder pain    # Bilateral low back pain  # LLE pain in S1 type pattern (no LE radiating pain reported today)      MRI of the cervical spine 1/22/24 shows:  -Minimal anterolisthesis at C5-6.   -Mild spondylosis, no high-grade SCS or NFS  -C6-7 left subarticular protrusion but no neuroforaminal stenosis or spinal canal stenosis    MRI of the lumbar spine 1/22/24 shows:  -Lower lumbar facet hypertrophy  -Disc protrusion at L5-S1 with lateral recess narrowing, abutting the descending bilateral S1 nerve roots    EMG 4/8/2024  Interpretation:  This is a normal study. In particular, there is no electrophysiologic evidence of right sided ulnar mononeuropathy or cervical radiculopathy. See comment.  Comment: If there remains clinical concern for right  ulnar mononeuropathy, right ulnar nerve ultrasound could be considered.     Differentials for neck/RUE pain includes ulnar neuropathy, muscle spasm/myofascial pain, cervical radiculopathy    Differential for low back pain includes: S1 lumbar radiculopathy, facet mediated pain, left intra-articular hip related pain, SI joint related pain        PLAN:  -EMG of the right upper extremity was reviewed in detail with the patient today.  The test was normal.  We discussed the option of obtaining right ulnar nerve ultrasound as the EMG and MRI of the cervical spine have thus far not revealed the source of her right elbow/right 4th and 5th digit paresthesias.  Her preference for now is to start occupational therapy.  I provided her with the phone number for scheduling.  We also discussed maintaining the right arm in extension overnight such as by loosely wrapping a towel around her right arm  -Could consider x-ray imaging of the SI joints.  Given only 2 SI joint tests positive suspicion is higher for lumbar radicular pain and/or lumbar facet mediated pain.  Additionally, patient prefers to hold off on SI joint x-rays at this time  -Cyclobenzaprine was refilled  -Continue PT with a home exercise program  -We discussed other options which could be considered such as gabapentin and/or MIKAELA.  She feels that the pain is adequately controlled without further intervention at this time  -RTC in 8 weeks    Ready to learn, no apparent learning barriers.  Education provided on treatment plan according to patient's preferred learning style.  Patient verbalizes understanding.   ______________________________    42 minutes spent by me on the date of the encounter doing chart review, history and exam, documentation and further activities per the note       Again, thank you for allowing me to participate in the care of your patient.      Sincerely,    MARINO Rizzo CNP

## 2024-04-18 NOTE — PATIENT INSTRUCTIONS
It was a pleasure seeing you in the clinic today.  As discussed, we made the following updates to your plan of care:     An order for occupational therapy was added at the last visit.  You may call [phone] for [facility], [phone] for [facility] and [phone] for [facility] for scheduling    Cyclobenzaprine was refilled today.  This is a muscle relaxer that may be taken for muscle tightness and pain as needed.  This drug can cause sedation / drowsiness, so when taking this medication avoid driving or other tasks that require you to be alert. Do not drink alcohol while taking this medication.     Let's plan to follow up in 8 weeks, or sooner if needed    Thank you,  Alysia Hartley NP  Physical Medicine and Rehabilitation, Medical Spine  PM&R clinic Phone: [phone]  PM&R clinic Fax: [phone]

## 2024-05-23 NOTE — PROGRESS NOTES
DISCHARGE  Reason for Discharge: Patient chooses to discontinue therapy.    Equipment Issued: HEP    Discharge Plan: Patient to continue home program.    Referring Provider:  Alysia Hartley

## 2024-07-28 ENCOUNTER — HEALTH MAINTENANCE LETTER (OUTPATIENT)
Age: 44
End: 2024-07-28

## 2024-07-31 DIAGNOSIS — B00.9 HERPES SIMPLEX: Primary | ICD-10-CM

## 2024-08-01 RX ORDER — VALACYCLOVIR HYDROCHLORIDE 500 MG/1
500 TABLET, FILM COATED ORAL DAILY
Qty: 90 TABLET | Refills: 0 | Status: SHIPPED | OUTPATIENT
Start: 2024-08-01

## 2024-09-15 ENCOUNTER — OFFICE VISIT (OUTPATIENT)
Dept: URGENT CARE | Facility: URGENT CARE | Age: 44
End: 2024-09-15
Payer: COMMERCIAL

## 2024-09-15 ENCOUNTER — ANCILLARY PROCEDURE (OUTPATIENT)
Dept: GENERAL RADIOLOGY | Facility: CLINIC | Age: 44
End: 2024-09-15
Payer: COMMERCIAL

## 2024-09-15 VITALS
WEIGHT: 179 LBS | TEMPERATURE: 97 F | OXYGEN SATURATION: 97 % | HEART RATE: 109 BPM | SYSTOLIC BLOOD PRESSURE: 118 MMHG | DIASTOLIC BLOOD PRESSURE: 78 MMHG | RESPIRATION RATE: 14 BRPM | BODY MASS INDEX: 27.13 KG/M2

## 2024-09-15 DIAGNOSIS — R05.1 ACUTE COUGH: ICD-10-CM

## 2024-09-15 DIAGNOSIS — R06.2 WHEEZING: ICD-10-CM

## 2024-09-15 DIAGNOSIS — J06.9 VIRAL URI WITH COUGH: Primary | ICD-10-CM

## 2024-09-15 PROCEDURE — 99213 OFFICE O/P EST LOW 20 MIN: CPT

## 2024-09-15 PROCEDURE — 71046 X-RAY EXAM CHEST 2 VIEWS: CPT | Mod: TC | Performed by: RADIOLOGY

## 2024-09-15 RX ORDER — BENZONATATE 200 MG/1
200 CAPSULE ORAL 3 TIMES DAILY PRN
Qty: 21 CAPSULE | Refills: 0 | Status: SHIPPED | OUTPATIENT
Start: 2024-09-15

## 2024-09-15 RX ORDER — ALBUTEROL SULFATE 90 UG/1
2 AEROSOL, METERED RESPIRATORY (INHALATION) EVERY 6 HOURS PRN
Qty: 8.5 G | Refills: 0 | Status: SHIPPED | OUTPATIENT
Start: 2024-09-15

## 2024-09-15 ASSESSMENT — PAIN SCALES - GENERAL: PAINLEVEL: NO PAIN (0)

## 2024-09-15 NOTE — PROGRESS NOTES
ASSESSMENT:  (J06.9) Viral URI with cough  (primary encounter diagnosis)  Plan: benzonatate (TESSALON) 200 MG capsule    (R05.1) Acute cough  Plan: XR Chest 2 Views    (R06.2) Wheezing  Plan: albuterol (PROAIR HFA/PROVENTIL HFA/VENTOLIN         HFA) 108 (90 Base) MCG/ACT inhaler    PLAN:  Informed the patient that the chest x-ray does not show any pneumonia per the radiologist report.  We discussed taking the benzonatate and using the albuterol inhaler as prescribed for her symptoms.  We also discussed the need for her to get plenty rest, drink fluids and follow-up with her primary care provider should symptoms persist.  Patient acknowledged her understanding of the above plan.    The use of Dragon/Sqordation services may have been used to construct the content in this note; any grammatical or spelling errors are non-intentional. Please contact the author of this note directly if you are in need of any clarification.      Juarez Bhatti, APRN CNP      SUBJECTIVE:  Sherri Milton is a 44 year old female who presents to the clinic today with a chief complaint of cough  for 5 day(s).  Her cough is described as productive.    The patient's symptoms are worsening.  Associated symptoms include fever, shortness of breath, and wheezing. Patient has been using DayQuil, NyQuil and ibuprofen to improve symptoms.    ROS  Negative except noted above.     OBJECTIVE:  /78   Pulse 109   Temp 97  F (36.1  C) (Tympanic)   Resp 14   Wt 81.2 kg (179 lb)   SpO2 97%   BMI 27.13 kg/m    GENERAL APPEARANCE: healthy, alert and no distress  EYES: EOMI,  PERRL, conjunctiva clear  HENT: ear canals and TM's normal.  Nose and mouth without ulcers, erythema or lesions  NECK: supple, nontender, no lymphadenopathy  RESP: expiratory wheezes bilateral  CV: regular rates and rhythm, normal S1 S2, no murmur noted  SKIN: no suspicious lesions or rashes    X-RAY: Chest x-ray does not show any pneumonia per the radiologist  report

## 2024-09-15 NOTE — PATIENT INSTRUCTIONS
Chest x-ray does not show any pneumonia per the radiologist report.  Take the benzonatate and use the albuterol inhaler as prescribed for your symptoms.  Get plenty rest and drink fluids.  Follow-up with your primary care provider should symptoms persist.

## 2024-10-30 SDOH — HEALTH STABILITY: PHYSICAL HEALTH: ON AVERAGE, HOW MANY MINUTES DO YOU ENGAGE IN EXERCISE AT THIS LEVEL?: 0 MIN

## 2024-10-30 SDOH — HEALTH STABILITY: PHYSICAL HEALTH: ON AVERAGE, HOW MANY DAYS PER WEEK DO YOU ENGAGE IN MODERATE TO STRENUOUS EXERCISE (LIKE A BRISK WALK)?: 0 DAYS

## 2024-10-30 ASSESSMENT — SOCIAL DETERMINANTS OF HEALTH (SDOH): HOW OFTEN DO YOU GET TOGETHER WITH FRIENDS OR RELATIVES?: ONCE A WEEK

## 2024-11-04 ENCOUNTER — OFFICE VISIT (OUTPATIENT)
Dept: FAMILY MEDICINE | Facility: CLINIC | Age: 44
End: 2024-11-04
Payer: COMMERCIAL

## 2024-11-04 VITALS
WEIGHT: 186 LBS | OXYGEN SATURATION: 99 % | BODY MASS INDEX: 28.19 KG/M2 | SYSTOLIC BLOOD PRESSURE: 138 MMHG | HEIGHT: 68 IN | RESPIRATION RATE: 16 BRPM | DIASTOLIC BLOOD PRESSURE: 84 MMHG | TEMPERATURE: 96.3 F | HEART RATE: 72 BPM

## 2024-11-04 DIAGNOSIS — B00.9 HERPES SIMPLEX: ICD-10-CM

## 2024-11-04 DIAGNOSIS — Z12.4 CERVICAL CANCER SCREENING: ICD-10-CM

## 2024-11-04 DIAGNOSIS — R87.810 CERVICAL HIGH RISK HPV (HUMAN PAPILLOMAVIRUS) TEST POSITIVE: ICD-10-CM

## 2024-11-04 DIAGNOSIS — R39.15 URINARY URGENCY: ICD-10-CM

## 2024-11-04 DIAGNOSIS — Z00.00 ROUTINE GENERAL MEDICAL EXAMINATION AT A HEALTH CARE FACILITY: Primary | ICD-10-CM

## 2024-11-04 PROCEDURE — 99213 OFFICE O/P EST LOW 20 MIN: CPT | Mod: 25 | Performed by: PHYSICIAN ASSISTANT

## 2024-11-04 PROCEDURE — G0145 SCR C/V CYTO,THINLAYER,RESCR: HCPCS | Performed by: PHYSICIAN ASSISTANT

## 2024-11-04 PROCEDURE — 90471 IMMUNIZATION ADMIN: CPT | Performed by: PHYSICIAN ASSISTANT

## 2024-11-04 PROCEDURE — 91320 SARSCV2 VAC 30MCG TRS-SUC IM: CPT | Performed by: PHYSICIAN ASSISTANT

## 2024-11-04 PROCEDURE — 90656 IIV3 VACC NO PRSV 0.5 ML IM: CPT | Performed by: PHYSICIAN ASSISTANT

## 2024-11-04 PROCEDURE — 99396 PREV VISIT EST AGE 40-64: CPT | Mod: 25 | Performed by: PHYSICIAN ASSISTANT

## 2024-11-04 PROCEDURE — 87624 HPV HI-RISK TYP POOLED RSLT: CPT | Performed by: PHYSICIAN ASSISTANT

## 2024-11-04 PROCEDURE — 90480 ADMN SARSCOV2 VAC 1/ONLY CMP: CPT | Performed by: PHYSICIAN ASSISTANT

## 2024-11-04 RX ORDER — VALACYCLOVIR HYDROCHLORIDE 500 MG/1
500 TABLET, FILM COATED ORAL DAILY
Qty: 90 TABLET | Refills: 3 | Status: SHIPPED | OUTPATIENT
Start: 2024-11-04

## 2024-11-04 ASSESSMENT — PATIENT HEALTH QUESTIONNAIRE - PHQ9
SUM OF ALL RESPONSES TO PHQ QUESTIONS 1-9: 4
10. IF YOU CHECKED OFF ANY PROBLEMS, HOW DIFFICULT HAVE THESE PROBLEMS MADE IT FOR YOU TO DO YOUR WORK, TAKE CARE OF THINGS AT HOME, OR GET ALONG WITH OTHER PEOPLE: NOT DIFFICULT AT ALL
SUM OF ALL RESPONSES TO PHQ QUESTIONS 1-9: 4

## 2024-11-04 ASSESSMENT — PAIN SCALES - GENERAL: PAINLEVEL_OUTOF10: NO PAIN (0)

## 2024-11-04 NOTE — PATIENT INSTRUCTIONS
Patient Education   Preventive Care Advice   This is general advice given by our system to help you stay healthy. However, your care team may have specific advice just for you. Please talk to your care team about your preventive care needs.  Nutrition  Eat 5 or more servings of fruits and vegetables each day.  Try wheat bread, brown rice and whole grain pasta (instead of white bread, rice, and pasta).  Get enough calcium and vitamin D. Check the label on foods and aim for 100% of the RDA (recommended daily allowance).  Lifestyle  Exercise at least 150 minutes each week  (30 minutes a day, 5 days a week).  Do muscle strengthening activities 2 days a week. These help control your weight and prevent disease.  No smoking.  Wear sunscreen to prevent skin cancer.  Have a dental exam and cleaning every 6 months.  Yearly exams  See your health care team every year to talk about:  Any changes in your health.  Any medicines your care team has prescribed.  Preventive care, family planning, and ways to prevent chronic diseases.  Shots (vaccines)   HPV shots (up to age 26), if you've never had them before.  Hepatitis B shots (up to age 59), if you've never had them before.  COVID-19 shot: Get this shot when it's due.  Flu shot: Get a flu shot every year.  Tetanus shot: Get a tetanus shot every 10 years.  Pneumococcal, hepatitis A, and RSV shots: Ask your care team if you need these based on your risk.  Shingles shot (for age 50 and up)  General health tests  Diabetes screening:  Starting at age 35, Get screened for diabetes at least every 3 years.  If you are younger than age 35, ask your care team if you should be screened for diabetes.  Cholesterol test: At age 39, start having a cholesterol test every 5 years, or more often if advised.  Bone density scan (DEXA): At age 50, ask your care team if you should have this scan for osteoporosis (brittle bones).  Hepatitis C: Get tested at least once in your life.  STIs (sexually  transmitted infections)  Before age 24: Ask your care team if you should be screened for STIs.  After age 24: Get screened for STIs if you're at risk. You are at risk for STIs (including HIV) if:  You are sexually active with more than one person.  You don't use condoms every time.  You or a partner was diagnosed with a sexually transmitted infection.  If you are at risk for HIV, ask about PrEP medicine to prevent HIV.  Get tested for HIV at least once in your life, whether you are at risk for HIV or not.  Cancer screening tests  Cervical cancer screening: If you have a cervix, begin getting regular cervical cancer screening tests starting at age 21.  Breast cancer scan (mammogram): If you've ever had breasts, begin having regular mammograms starting at age 40. This is a scan to check for breast cancer.  Colon cancer screening: It is important to start screening for colon cancer at age 45.  Have a colonoscopy test every 10 years (or more often if you're at risk) Or, ask your provider about stool tests like a FIT test every year or Cologuard test every 3 years.  To learn more about your testing options, visit:   .  For help making a decision, visit:   https://bit.ly/cy13136.  Prostate cancer screening test: If you have a prostate, ask your care team if a prostate cancer screening test (PSA) at age 55 is right for you.  Lung cancer screening: If you are a current or former smoker ages 50 to 80, ask your care team if ongoing lung cancer screenings are right for you.  For informational purposes only. Not to replace the advice of your health care provider. Copyright   2023 [facility]. All rights reserved. Clinically reviewed by the [facility] Transitions Program. [employer] 916811 - REV 01/24.  Substance Use Disorder: Care Instructions  Overview     You can improve your life and health by stopping your use of alcohol or drugs. When you don't drink or use drugs, you may feel and sleep better. You may  get along better with your family, friends, and coworkers. There are medicines and programs that can help with substance use disorder.  How can you care for yourself at home?  Here are some ways to help you stay sober and prevent relapse.  If you have been given medicine to help keep you sober or reduce your cravings, be sure to take it exactly as prescribed.  Talk to your doctor about programs that can help you stop using drugs or drinking alcohol.  Do not keep alcohol or drugs in your home.  Plan ahead. Think about what you'll say if other people ask you to drink or use drugs. Try not to spend time with people who drink or use drugs.  Use the time and money spent on drinking or drugs to do something that's important to you.  Preventing a relapse  Have a plan to deal with relapse. Learn to recognize changes in your thinking that lead you to drink or use drugs. Get help before you start to drink or use drugs again.  Try to stay away from situations, friends, or places that may lead you to drink or use drugs.  If you feel the need to drink alcohol or use drugs again, seek help right away. Call a trusted friend or family member. Some people get support from organizations such as Narcotics Anonymous or [employer] or from treatment facilities.  If you relapse, get help as soon as you can. Some people make a plan with another person that outlines what they want that person to do for them if they relapse. The plan usually includes how to handle the relapse and who to notify in case of relapse.  Don't give up. Remember that a relapse doesn't mean that you have failed. Use the experience to learn the triggers that lead you to drink or use drugs. Then quit again. Recovery is a lifelong process. Many people have several relapses before they are able to quit for good.  Follow-up care is a key part of your treatment and safety. Be sure to make and go to all appointments, and call your doctor if you are having problems. It's  "also a good idea to know your test results and keep a list of the medicines you take.  When should you call for help?   Call 911  anytime you think you may need emergency care. For example, call if you or someone else:    Has overdosed or has withdrawal signs. Be sure to tell the emergency workers that you are or someone else is using or trying to quit using drugs. Overdose or withdrawal signs may include:  Losing consciousness.  Seizure.  Seeing or hearing things that aren't there (hallucinations).     Is thinking or talking about suicide or harming others.   Where to get help 24 hours a day, 7 days a week   If you or someone you know talks about suicide, self-harm, a mental health crisis, a substance use crisis, or any other kind of emotional distress, get help right away. You can:    Call the Suicide and Crisis Lifeline at 988.     Call 5-795-368-TALK (1-916.320.5241).     Text HOME to 021417 to access the Crisis Text Line.   Consider saving these numbers in your phone.  Go to [employer] for more information or to chat online.  Call your doctor now or seek immediate medical care if:    You are having withdrawal symptoms. These may include nausea or vomiting, sweating, shakiness, and anxiety.   Watch closely for changes in your health, and be sure to contact your doctor if:    You have a relapse.     You need more help or support to stop.   Where can you learn more?  Go to https://www.[employer].net/patiented  Enter H573 in the search box to learn more about \"Substance Use Disorder: Care Instructions.\"  Current as of: November 15, 2023  Content Version: 14.2 2024 [employer]   Care instructions adapted under license by your healthcare professional. If you have questions about a medical condition or this instruction, always ask your healthcare professional. [employer] disclaims any warranty or liability for your use of this information.    Safer Sex: Care " Instructions  Overview  Safer sex is a way to reduce your risk of getting a sexually transmitted infection (STI). It can also help prevent pregnancy.  Several products can help you practice safer sex and reduce your chance of STIs. One of the best is a condom. There are internal and external condoms. You can use a special rubber sheet (dental dam) for protection during oral sex. Disposable gloves can keep your hands from touching blood, semen, or other body fluids that can carry infections.  Remember that birth control methods such as diaphragms, IUDs, foams, and birth control pills do not stop you from getting STIs.  Follow-up care is a key part of your treatment and safety. Be sure to make and go to all appointments, and call your doctor if you are having problems. It's also a good idea to know your test results and keep a list of the medicines you take.  How can you care for yourself at home?  Think about getting vaccinated to help prevent hepatitis A, hepatitis B, and human papillomavirus (HPV). They can be spread through sex.  Use a condom every time you have sex. Use an external condom, which goes on the penis. Or use an internal condom, which goes into the vagina or anus.  Make sure you use the right size external condom. A condom that's too small can break easily. A condom that's too big can slip off during sex.  Use a new condom each time you have sex. Be careful not to poke a hole in the condom when you open the wrapper.  Don't use an internal condom and an external condom at the same time.  Never use petroleum jelly (such as Vaseline), grease, hand lotion, baby oil, or anything with oil in it. These products can make holes in the condom.  After intercourse, hold the edge of the condom as you remove it. This will help keep semen from spilling out of the condom.  Do not have sex with anyone who has symptoms of an STI, such as sores on the genitals or mouth.  Do not drink a lot of alcohol or use drugs before  "sex.  Limit your sex partners. Sex with one partner who has sex only with you can reduce your risk of getting an STI.  Don't share sex toys. But if you do share them, use a condom and clean the sex toys between each use.  Talk to any partners before you have sex. Talk about what you feel comfortable with and whether you have any boundaries with sex. And find out if your partner or partners may be at risk for any STI. Keep in mind that a person may be able to spread an STI even if they do not have symptoms. You and any partners may want to get tested for STIs.  Where can you learn more?  Go to https://www.[employer].net/patiented  Enter B608 in the search box to learn more about \"Safer Sex: Care Instructions.\"  Current as of: November 27, 2023  Content Version: 14.2 2024 [facility]   Care instructions adapted under license by your healthcare professional. If you have questions about a medical condition or this instruction, always ask your healthcare professional. [employer] disclaims any warranty or liability for your use of this information.       "

## 2024-11-05 LAB
HPV HR 12 DNA CVX QL NAA+PROBE: NEGATIVE
HPV16 DNA CVX QL NAA+PROBE: NEGATIVE
HPV18 DNA CVX QL NAA+PROBE: NEGATIVE
HUMAN PAPILLOMA VIRUS FINAL DIAGNOSIS: NORMAL

## 2024-11-06 ENCOUNTER — MYC MEDICAL ADVICE (OUTPATIENT)
Dept: FAMILY MEDICINE | Facility: CLINIC | Age: 44
End: 2024-11-06
Payer: COMMERCIAL

## 2024-11-06 DIAGNOSIS — Z23 NEED FOR HPV VACCINATION: Primary | ICD-10-CM

## 2024-11-07 LAB
BKR AP ASSOCIATED HPV REPORT: NORMAL
BKR LAB AP GYN ADEQUACY: NORMAL
BKR LAB AP GYN INTERPRETATION: NORMAL
BKR LAB AP PREVIOUS ABNL DX: NORMAL
BKR LAB AP PREVIOUS ABNORMAL: NORMAL
PATH REPORT.COMMENTS IMP SPEC: NORMAL
PATH REPORT.COMMENTS IMP SPEC: NORMAL
PATH REPORT.RELEVANT HX SPEC: NORMAL

## 2024-12-06 ENCOUNTER — ANCILLARY PROCEDURE (OUTPATIENT)
Dept: MAMMOGRAPHY | Facility: CLINIC | Age: 44
End: 2024-12-06
Attending: PHYSICIAN ASSISTANT
Payer: COMMERCIAL

## 2024-12-06 DIAGNOSIS — Z12.31 VISIT FOR SCREENING MAMMOGRAM: ICD-10-CM

## 2024-12-06 PROCEDURE — 77067 SCR MAMMO BI INCL CAD: CPT | Mod: TC | Performed by: RADIOLOGY

## 2024-12-06 PROCEDURE — 77063 BREAST TOMOSYNTHESIS BI: CPT | Mod: TC | Performed by: RADIOLOGY

## 2025-01-26 DIAGNOSIS — B00.9 HERPES SIMPLEX: ICD-10-CM

## 2025-01-27 RX ORDER — VALACYCLOVIR HYDROCHLORIDE 500 MG/1
500 TABLET, FILM COATED ORAL DAILY
Qty: 90 TABLET | Refills: 2 | Status: SHIPPED | OUTPATIENT
Start: 2025-01-27

## 2025-06-10 ASSESSMENT — PATIENT HEALTH QUESTIONNAIRE - PHQ9
SUM OF ALL RESPONSES TO PHQ QUESTIONS 1-9: 3
SUM OF ALL RESPONSES TO PHQ QUESTIONS 1-9: 3
10. IF YOU CHECKED OFF ANY PROBLEMS, HOW DIFFICULT HAVE THESE PROBLEMS MADE IT FOR YOU TO DO YOUR WORK, TAKE CARE OF THINGS AT HOME, OR GET ALONG WITH OTHER PEOPLE: NOT DIFFICULT AT ALL

## 2025-06-11 ENCOUNTER — OFFICE VISIT (OUTPATIENT)
Dept: FAMILY MEDICINE | Facility: CLINIC | Age: 45
End: 2025-06-11
Payer: COMMERCIAL

## 2025-06-11 ENCOUNTER — RESULTS FOLLOW-UP (OUTPATIENT)
Dept: FAMILY MEDICINE | Facility: CLINIC | Age: 45
End: 2025-06-11

## 2025-06-11 VITALS
DIASTOLIC BLOOD PRESSURE: 76 MMHG | SYSTOLIC BLOOD PRESSURE: 118 MMHG | HEART RATE: 87 BPM | BODY MASS INDEX: 29.51 KG/M2 | RESPIRATION RATE: 16 BRPM | TEMPERATURE: 98 F | OXYGEN SATURATION: 99 % | WEIGHT: 188 LBS | HEIGHT: 67 IN

## 2025-06-11 DIAGNOSIS — N76.0 BACTERIAL VAGINOSIS: Primary | ICD-10-CM

## 2025-06-11 DIAGNOSIS — B96.89 BACTERIAL VAGINOSIS: Primary | ICD-10-CM

## 2025-06-11 DIAGNOSIS — N76.0 VAGINITIS AND VULVOVAGINITIS: Primary | ICD-10-CM

## 2025-06-11 DIAGNOSIS — Z11.3 SCREEN FOR STD (SEXUALLY TRANSMITTED DISEASE): ICD-10-CM

## 2025-06-11 LAB
C TRACH DNA SPEC QL NAA+PROBE: NEGATIVE
CLUE CELLS: PRESENT
HCV AB SERPL QL IA: NONREACTIVE
HIV 1+2 AB+HIV1 P24 AG SERPL QL IA: NONREACTIVE
N GONORRHOEA DNA SPEC QL NAA+PROBE: NEGATIVE
SPECIMEN TYPE: NORMAL
SPECIMEN TYPE: NORMAL
T PALLIDUM AB SER QL: NONREACTIVE
TRICHOMONAS, WET PREP: ABNORMAL
WBC'S/HIGH POWER FIELD, WET PREP: ABNORMAL
YEAST, WET PREP: ABNORMAL

## 2025-06-11 PROCEDURE — 3078F DIAST BP <80 MM HG: CPT | Performed by: PHYSICIAN ASSISTANT

## 2025-06-11 PROCEDURE — 99213 OFFICE O/P EST LOW 20 MIN: CPT | Performed by: PHYSICIAN ASSISTANT

## 2025-06-11 PROCEDURE — 3074F SYST BP LT 130 MM HG: CPT | Performed by: PHYSICIAN ASSISTANT

## 2025-06-11 PROCEDURE — 86780 TREPONEMA PALLIDUM: CPT | Performed by: PHYSICIAN ASSISTANT

## 2025-06-11 PROCEDURE — 87591 N.GONORRHOEAE DNA AMP PROB: CPT | Performed by: PHYSICIAN ASSISTANT

## 2025-06-11 PROCEDURE — 87491 CHLMYD TRACH DNA AMP PROBE: CPT | Performed by: PHYSICIAN ASSISTANT

## 2025-06-11 PROCEDURE — 87389 HIV-1 AG W/HIV-1&-2 AB AG IA: CPT | Performed by: PHYSICIAN ASSISTANT

## 2025-06-11 PROCEDURE — 1126F AMNT PAIN NOTED NONE PRSNT: CPT | Performed by: PHYSICIAN ASSISTANT

## 2025-06-11 PROCEDURE — 86803 HEPATITIS C AB TEST: CPT | Performed by: PHYSICIAN ASSISTANT

## 2025-06-11 PROCEDURE — 87210 SMEAR WET MOUNT SALINE/INK: CPT | Performed by: PHYSICIAN ASSISTANT

## 2025-06-11 PROCEDURE — 36415 COLL VENOUS BLD VENIPUNCTURE: CPT | Performed by: PHYSICIAN ASSISTANT

## 2025-06-11 RX ORDER — METRONIDAZOLE 500 MG/1
500 TABLET ORAL 2 TIMES DAILY
Qty: 14 TABLET | Refills: 0 | Status: SHIPPED | OUTPATIENT
Start: 2025-06-11 | End: 2025-06-18

## 2025-06-11 ASSESSMENT — PAIN SCALES - GENERAL: PAINLEVEL_OUTOF10: NO PAIN (0)

## 2025-06-11 NOTE — PROGRESS NOTES
"  Assessment & Plan     Vaginitis and vulvovaginitis  Screen for STD (sexually transmitted disease)  She has elected to get her results via [facility]. I will send as soon as they are available and treat as needed.   - Wet prep - Clinic Collect  - NEISSERIA GONORRHOEA PCR; Future  - CHLAMYDIA TRACHOMATIS PCR; Future  - Hepatitis C antibody; Future  - HIV Antigen Antibody Combo; Future  - Treponema Abs w Reflex to RPR and Titer; Future  - CHLAMYDIA TRACHOMATIS PCR  - NEISSERIA GONORRHOEA PCR  - Hepatitis C antibody  - HIV Antigen Antibody Combo  - Treponema Abs w Reflex to RPR and Titer          BMI  Estimated body mass index is 29.23 kg/m  as calculated from the following:    Height as of this encounter: 1.708 m (5' 7.25\").    Weight as of this encounter: 85.3 kg (188 lb).             Subjective   Sherri is a 45 year old, presenting for the following health issues:  STD      6/11/2025     8:08 AM   Additional Questions   Roomed by Chris OWENS MA     STD    History of Present Illness       Reason for visit:  STD testing  Symptom onset:  1-2 weeks ago  Symptoms include:  Painful vaginal area, burning , new sexual partner about 4 weeks ago  Symptom intensity:  Moderate  Symptom progression:  Staying the same  Had these symptoms before:  No  What makes it worse:  No  What makes it better:  No   She is taking medications regularly.      Sherri is here today for vaginitis. She has had generalized external discomfort and burning. Minimal discharge. No dysuria.   She had a new sexual partner within the past 2 weeks. She requests STD screening, no known exposure.               Review of Systems  Constitutional, HEENT, cardiovascular, pulmonary, GI, , musculoskeletal, neuro, skin, endocrine and psych systems are negative, except as otherwise noted.      Objective    /76   Pulse 87   Temp 98  F (36.7  C) (Tympanic)   Resp 16   Ht 1.708 m (5' 7.25\")   Wt 85.3 kg (188 lb)   LMP  (LMP Unknown)   SpO2 99%   Breastfeeding No  "  BMI 29.23 kg/m    Body mass index is 29.23 kg/m .  Physical Exam   GENERAL: alert and no distress   (female): normal female external genitalia, normal urethral meatus , normal vaginal mucosa, and normal appearing cervix, scant yellow discharge.             Signed Electronically by: Kristen M. Kehr, PA-C

## 2025-07-16 ENCOUNTER — MYC MEDICAL ADVICE (OUTPATIENT)
Dept: FAMILY MEDICINE | Facility: CLINIC | Age: 45
End: 2025-07-16
Payer: COMMERCIAL

## 2025-07-16 DIAGNOSIS — B96.89 BACTERIAL VAGINOSIS: ICD-10-CM

## 2025-07-16 DIAGNOSIS — N76.0 BACTERIAL VAGINOSIS: ICD-10-CM

## 2025-07-16 NOTE — TELEPHONE ENCOUNTER
Routing to PCP for review. Please see [employer] message and advise if an e-visit is needed. Pharmacy pending if appropriate.     Shireen Monreal RN on 7/16/2025 at 5:26 PM

## 2025-07-17 RX ORDER — METRONIDAZOLE 500 MG/1
500 TABLET ORAL 2 TIMES DAILY
Qty: 14 TABLET | Refills: 0 | Status: SHIPPED | OUTPATIENT
Start: 2025-07-17